# Patient Record
Sex: FEMALE | Race: WHITE | NOT HISPANIC OR LATINO | Employment: OTHER | URBAN - NONMETROPOLITAN AREA
[De-identification: names, ages, dates, MRNs, and addresses within clinical notes are randomized per-mention and may not be internally consistent; named-entity substitution may affect disease eponyms.]

---

## 2022-03-01 ENCOUNTER — HISTORICAL (OUTPATIENT)
Dept: ADMINISTRATIVE | Facility: HOSPITAL | Age: 37
End: 2022-03-01

## 2022-04-11 ENCOUNTER — HISTORICAL (OUTPATIENT)
Dept: ADMINISTRATIVE | Facility: HOSPITAL | Age: 37
End: 2022-04-11

## 2022-04-25 VITALS
WEIGHT: 293 LBS | DIASTOLIC BLOOD PRESSURE: 80 MMHG | HEIGHT: 64 IN | BODY MASS INDEX: 50.02 KG/M2 | SYSTOLIC BLOOD PRESSURE: 138 MMHG | OXYGEN SATURATION: 98 %

## 2022-10-05 ENCOUNTER — OFFICE VISIT (OUTPATIENT)
Dept: RHEUMATOLOGY | Facility: CLINIC | Age: 37
End: 2022-10-05
Payer: MEDICAID

## 2022-10-05 VITALS
DIASTOLIC BLOOD PRESSURE: 68 MMHG | WEIGHT: 293 LBS | BODY MASS INDEX: 50.02 KG/M2 | HEART RATE: 92 BPM | SYSTOLIC BLOOD PRESSURE: 132 MMHG | HEIGHT: 64 IN | RESPIRATION RATE: 18 BRPM | OXYGEN SATURATION: 97 %

## 2022-10-05 DIAGNOSIS — Z86.39 HISTORY OF VITAMIN D DEFICIENCY: ICD-10-CM

## 2022-10-05 DIAGNOSIS — Z11.59 ENCOUNTER FOR HEPATITIS C SCREENING TEST FOR LOW RISK PATIENT: ICD-10-CM

## 2022-10-05 DIAGNOSIS — R68.89 NONSPECIFIC ABNORMAL FINDING: ICD-10-CM

## 2022-10-05 DIAGNOSIS — M79.10 MYALGIA: ICD-10-CM

## 2022-10-05 DIAGNOSIS — Z11.1 SCREENING-PULMONARY TB: ICD-10-CM

## 2022-10-05 DIAGNOSIS — M25.59 PAIN IN OTHER JOINT: Primary | ICD-10-CM

## 2022-10-05 DIAGNOSIS — M51.36 LUMBAR DEGENERATIVE DISC DISEASE: ICD-10-CM

## 2022-10-05 DIAGNOSIS — M25.50 POLYARTHRALGIA: ICD-10-CM

## 2022-10-05 DIAGNOSIS — Z13.89 SCREENING FOR RHEUMATIC DISORDER: ICD-10-CM

## 2022-10-05 PROCEDURE — 3078F DIAST BP <80 MM HG: CPT | Mod: CPTII,S$GLB,, | Performed by: INTERNAL MEDICINE

## 2022-10-05 PROCEDURE — 3075F SYST BP GE 130 - 139MM HG: CPT | Mod: CPTII,S$GLB,, | Performed by: INTERNAL MEDICINE

## 2022-10-05 PROCEDURE — 99204 OFFICE O/P NEW MOD 45 MIN: CPT | Mod: S$GLB,,, | Performed by: INTERNAL MEDICINE

## 2022-10-05 PROCEDURE — 1160F PR REVIEW ALL MEDS BY PRESCRIBER/CLIN PHARMACIST DOCUMENTED: ICD-10-PCS | Mod: CPTII,S$GLB,, | Performed by: INTERNAL MEDICINE

## 2022-10-05 PROCEDURE — 1160F RVW MEDS BY RX/DR IN RCRD: CPT | Mod: CPTII,S$GLB,, | Performed by: INTERNAL MEDICINE

## 2022-10-05 PROCEDURE — 3078F PR MOST RECENT DIASTOLIC BLOOD PRESSURE < 80 MM HG: ICD-10-PCS | Mod: CPTII,S$GLB,, | Performed by: INTERNAL MEDICINE

## 2022-10-05 PROCEDURE — 3008F PR BODY MASS INDEX (BMI) DOCUMENTED: ICD-10-PCS | Mod: CPTII,S$GLB,, | Performed by: INTERNAL MEDICINE

## 2022-10-05 PROCEDURE — 1159F PR MEDICATION LIST DOCUMENTED IN MEDICAL RECORD: ICD-10-PCS | Mod: CPTII,S$GLB,, | Performed by: INTERNAL MEDICINE

## 2022-10-05 PROCEDURE — 99204 PR OFFICE/OUTPT VISIT, NEW, LEVL IV, 45-59 MIN: ICD-10-PCS | Mod: S$GLB,,, | Performed by: INTERNAL MEDICINE

## 2022-10-05 PROCEDURE — 3075F PR MOST RECENT SYSTOLIC BLOOD PRESS GE 130-139MM HG: ICD-10-PCS | Mod: CPTII,S$GLB,, | Performed by: INTERNAL MEDICINE

## 2022-10-05 PROCEDURE — 1159F MED LIST DOCD IN RCRD: CPT | Mod: CPTII,S$GLB,, | Performed by: INTERNAL MEDICINE

## 2022-10-05 PROCEDURE — 3008F BODY MASS INDEX DOCD: CPT | Mod: CPTII,S$GLB,, | Performed by: INTERNAL MEDICINE

## 2022-10-05 RX ORDER — ATENOLOL 50 MG/1
50 TABLET ORAL DAILY
COMMUNITY
Start: 2022-09-20 | End: 2023-02-03

## 2022-10-05 RX ORDER — BUPROPION HYDROCHLORIDE 150 MG/1
450 TABLET ORAL
COMMUNITY
Start: 2022-09-07

## 2022-10-05 RX ORDER — PANTOPRAZOLE SODIUM 40 MG/1
40 TABLET, DELAYED RELEASE ORAL DAILY
COMMUNITY
Start: 2022-09-28 | End: 2023-08-10

## 2022-10-05 RX ORDER — DICLOFENAC SODIUM 75 MG/1
TABLET, DELAYED RELEASE ORAL
COMMUNITY
Start: 2022-09-13 | End: 2023-08-10

## 2022-10-05 RX ORDER — ERGOCALCIFEROL 1.25 MG/1
50000 CAPSULE ORAL
COMMUNITY
Start: 2022-08-16 | End: 2023-08-10

## 2022-10-05 RX ORDER — DARUNAVIR, COBICISTAT, EMTRICITABINE, AND TENOFOVIR ALAFENAMIDE 800; 150; 200; 10 MG/1; MG/1; MG/1; MG/1
1 TABLET, FILM COATED ORAL DAILY
COMMUNITY
Start: 2022-09-20 | End: 2023-08-10

## 2022-10-05 RX ORDER — HYDROCHLOROTHIAZIDE 25 MG/1
25 TABLET ORAL EVERY MORNING
COMMUNITY
Start: 2022-09-20 | End: 2023-08-10

## 2022-10-05 RX ORDER — GLIMEPIRIDE 1 MG/1
TABLET ORAL
COMMUNITY
Start: 2022-09-20 | End: 2023-08-10

## 2022-10-05 RX ORDER — CLONAZEPAM 0.5 MG/1
TABLET ORAL
COMMUNITY
Start: 2022-09-22 | End: 2023-08-10

## 2022-10-21 NOTE — PROGRESS NOTES
Subjective:      Patient ID: Windy Ponce is a 37 y.o. female.    Chief Complaint: Disease Management    HPI:   Includes joint pain with subjective swelling.   Antecedent event includes: None;  Pain location includes: right elbow, hands, knees, right ankle, and feet;  Gradual onset; beginning >years ago;  Constant ache, Moderate in severity,   Lasting >hours, Worse daytime;   Improved with rest, medication, change in position, and none;   Worsened with activity and overuse;         Review of Systems   Constitutional:  Positive for fatigue. Negative for chills and fever.   HENT:  Negative for nasal congestion, ear pain, hearing loss, mouth dryness, mouth sores, nosebleeds and trouble swallowing.    Eyes:  Negative for photophobia, pain, redness, visual disturbance and eye dryness.   Respiratory:  Positive for shortness of breath. Negative for cough.    Cardiovascular:  Positive for leg swelling. Negative for chest pain and palpitations.   Gastrointestinal:  Negative for abdominal distention, abdominal pain, blood in stool, constipation, diarrhea, rectal pain, vomiting and fecal incontinence.   Endocrine: Negative for polydipsia and polyuria.   Genitourinary:  Negative for bladder incontinence, dysuria, enuresis, genital sores and hematuria.   Musculoskeletal:  Positive for arthralgias, joint swelling and myalgias.   Integumentary:  Negative for rash, wound and mole/lesion.   Neurological:  Negative for weakness and headaches.   Hematological:  Negative for adenopathy. Bruises/bleeds easily.   Psychiatric/Behavioral:  Positive for dysphoric mood and sleep disturbance. The patient is nervous/anxious.     Past Medical History:   Diagnosis Date    Acid reflux     Anxiety     Arthritis     Diabetes mellitus     Dry mouth     HIV infection     Hypertension      History reviewed. No pertinent surgical history.   See the Assessment/Plan for further characterization of the HPI, ROS, Medical, Surgical, Family, and Social  "Histories including Tobacco use, Meds; all of which has been reviewed in Epic.    Medication List with Changes/Refills   New Medications    HYDROXYCHLOROQUINE (PLAQUENIL) 200 MG TABLET    Take 1.5 tablets (300 mg total) by mouth once daily.   Current Medications    ATENOLOL (TENORMIN) 50 MG TABLET    Take 50 mg by mouth once daily.    BUPROPION (WELLBUTRIN XL) 150 MG TB24 TABLET    Take 450 mg by mouth.    CLONAZEPAM (KLONOPIN) 0.5 MG TABLET    TAKE ONE TABLET BY MOUTH TWICE DAILY AS NEEDED FOR SEVERE anxiety    DICLOFENAC (VOLTAREN) 75 MG EC TABLET    TAKE 1 TABLET BY MOUTH TWO TIMES A DAY WITH MEALS MAY TAKE WITH TYLENOL AVOID OTHER NSAIDS    ERGOCALCIFEROL (ERGOCALCIFEROL) 50,000 UNIT CAP    Take 50,000 Units by mouth every 7 days.    GLIMEPIRIDE (AMARYL) 1 MG TABLET    TAKE ONE TABLET BY MOUTH WITH BREAKFAST OR THE FIRST meal of THE DAY EVERY DAY    HYDROCHLOROTHIAZIDE (HYDRODIURIL) 25 MG TABLET    Take 25 mg by mouth every morning.    PANTOPRAZOLE (PROTONIX) 40 MG TABLET    Take 40 mg by mouth once daily.    PREGABALIN (LYRICA) 75 MG CAPSULE    Take 75 mg by mouth 2 (two) times daily.    SYMTUZA [id number] MG TAB    Take 1 tablet by mouth once daily.         Objective:   BP (!) 142/77   Pulse 109   Resp 18   Ht 5' 4" (1.626 m)   Wt (!) 230.8 kg (508 lb 14.4 oz)   SpO2 95%   BMI 87.35 kg/m²   Physical Exam  Non-toxic appearance. No distress.   Normocephalic and atraumatic.   External ears normal.    Conjunctivae and EOM are normal. No scleral icterus.   RRR, No friction rub; palpable distal pulses.    No tachypnea or signs of respiratory distress.   Abdominal: No guarding or rebound tenderness.   Neurological: Oriented. Normal thought content. Strength is grossly normal without focal deficit.  Skin is warm. No pallor.   Musculoskeletal: see below for further input.     X-Ray Chest PA Lateral With Lordotic Vie  CHEST 2 VIEWS :     HISTORY: Cough          COMPARISON: None available     FINDINGS: PA/AP " and lateral views reveal the heart to be normal in  size. The trachea is midline. No definite infiltrate or effusion is  seen. There is mild prominence of the parahilar regions. Bony  structures are grossly intact.     IMPRESSION:  1.Mildly prominent parahilar regions bilaterally compatible with a  mild bronchitis. Clinical correlation is indicated.        Electronically Signed By: Dax Barreto MD  Date/Time Signed: 03/07/2017 08:46    No visits with results within 1 Year(s) from this visit.   Latest known visit with results is:   No results found for any previous visit.        All of the data above and below has been reviewed by myself and any further interpretations will be reflected in the assessment and plan.   The data includes review of external notes, and independent interpretation of lab results, x-rays, and imaging reports.  Active inflammatory arthritis is an illness that poses a threat to bodily function and even a threat to life in some cases.  Drug therapy to treat inflammatory arthritis usually requires intensive monitoring for toxicity.    Review of patient's allergies indicates:   Allergen Reactions    Penicillin Rash     Assessment/Plan:         Prev noted/prior plan:  (She tells me she went to get MRI knee but too large for the machine and to have weight loss surgery in the near future     She tells me she also has chronic HIV noted     No overt synovitis  Mouth mucosa moist; she tells me she has chronic intermittent dry mouth noted  Obese  DJD     Verbal education     Blood work to further evaluate     We will seek records re HIV most recent note     Cont current for now; sounds like mostly comfortable at rest     Revisit weeks     Contact us prn)    This visit:    Interim lab 10/28/22:    SPEP normal    IgG 1745 little high  IgA 337  IgM 252 little high    Creat 0.89; alb 3.6      ALT 41  AST 42    CPK 93    WBC 9.5; Hgb 12.7; plt 343    JUNAID neg RF 58+ CCP neg HepBsAgnegsAb+coreAbneg HCV  "neg Uric acid 6.2    C3 223  C4 40      ESR not done CRP 5.3      Interim records re HIV most recent note:      Prev noted:  (She tells me she went to get MRI knee but too large for the machine and to have weight loss surgery in the near future   She tells me she also has chronic HIV noted   No overt synovitis  Mouth mucosa moist; she tells me she has chronic intermittent dry mouth noted  Obese  DJD   Blood work to further evaluate   We will seek records re HIV most recent note   Voltaren 75mg bid prn   DM amaryl noted   Clonazepam noted   Wellbutrin   Prior cymbalta noted   Had MRSA abdominal skin abscess in early 2022 per very limited records   Cont current for now; sounds like mostly comfortable at rest)      She sounds like she has been doing worse and reports some interim joint swelling noted    Facial rash she tells me is rosacea but may also have component of seb derm; She tells me she has had the rash her whole life; She has never seen a Dermatologist but tells me she recently saw an Orthopedics doctor who told her is was likely a "rheumatoid rash" noted      No overt synovitis but some difficult with body habitus noted  See above    Verbal education and some written    Went over lab     I am okay with     adding plaquenil 300mg daily for now; she can call if joint swelling recurs    Refer to Ophthalmology for plaquenil exam    We will refer to Dermatology for the rash and skin exam    We will reseek records re HIV most recent note    We will also seek records from PCP most recent note    Cont Voltaren 75mg bid prn     Lyrica 75mg bid noted: she initially was not sure if using but I investigated and she got from [facility] [place] [facility] and she recalls getting the voltaren and a bit back and forth about the lyrica and not sure what was said or plan ( told me "nothing" until I brought up the new meds noted) but to also revisit in month and they can have the note sent to me and I can go over it with " her    Revisit lab 2 weeks prior    Contact us prn      JUNAID neg RF93.7+ repeat RF 58+ CCP neg HepBsAgnegsAb+coreAbneg HCV neg Uric acid 6.2  There is some history to suggest inflammatory arthritis.     There is also some mechanical and neurogenic pain.     Lumbar DJD DDD per records     She should consider also working with Physical Medicine and Rehab MD if needed     Prev noted/prior plan:  (She tells me she went to get MRI knee but too large for the machine and to have weight loss surgery in the near future     She tells me she also has chronic HIV noted     No overt synovitis  Mouth mucosa moist; she tells me she has chronic intermittent dry mouth noted  Obese  DJD     Verbal education   Blood work to further evaluate   We will seek records re HIV most recent note   Cont current for now; sounds like mostly comfortable at rest   Revisit weeks   Contact us prn)     Has been managing with:     Voltaren 75mg bid prn     Has been working with:     DM amaryl noted     Clonazepam noted     Wellbutrin     Prior cymbalta noted     Had MRSA abdominal skin abscess in early 2022 per very limited records     HIV also mentioned on Medical history in Epic     We will seek records regarding the HIV infection     Records [facility] PCP some of note 8/16/22: f/u herniated disc lumbar; A/P: Labs ESR 76; RF 93.7; vitD 21.6; CRP 4.6; will place on home therapy for back and knees; refer to rheumatology; morbid obese weighs over 500lbs;  bariatric surgery discussed; add vitD 50,000 units weekly; consider MRI lumbar; revisit 4 weeks; diclofenac 75mg ibuprofen 800mg cymbalta 60mg metformin other.     Review of medical records as stated above.  Lab +/- imaging and other ordered diagnostic studies to further evaluate.  See the orders associated with this note visit.  Medications as prescribed as tolerated.    Education including verbal and those noted in the patient instructions.  Revisit as scheduled and call prn.    The following  diagnoses influence medical decision making and/or need further workup including the orders listed below:    Pain in other joint  -     CBC Auto Differential; Future; Expected date: 01/20/2023  -     CK; Future; Expected date: 01/20/2023  -     Comprehensive Metabolic Panel; Future; Expected date: 01/20/2023  -     C-Reactive Protein; Future; Expected date: 01/20/2023  -     Sedimentation rate; Future; Expected date: 01/20/2023  -     Urinalysis; Future; Expected date: 01/20/2023    Other osteoarthritis involving multiple joints  -     CBC Auto Differential; Future; Expected date: 01/20/2023  -     CK; Future; Expected date: 01/20/2023  -     Comprehensive Metabolic Panel; Future; Expected date: 01/20/2023  -     C-Reactive Protein; Future; Expected date: 01/20/2023  -     Sedimentation rate; Future; Expected date: 01/20/2023  -     Urinalysis; Future; Expected date: 01/20/2023  -     hydrOXYchloroQUINE (PLAQUENIL) 200 mg tablet; Take 1.5 tablets (300 mg total) by mouth once daily.  Dispense: 45 tablet; Refill: 4    Lumbar degenerative disc disease  -     CBC Auto Differential; Future; Expected date: 01/20/2023  -     CK; Future; Expected date: 01/20/2023  -     Comprehensive Metabolic Panel; Future; Expected date: 01/20/2023  -     C-Reactive Protein; Future; Expected date: 01/20/2023  -     Sedimentation rate; Future; Expected date: 01/20/2023  -     Urinalysis; Future; Expected date: 01/20/2023    Polyarthralgia  -     CBC Auto Differential; Future; Expected date: 01/20/2023  -     CK; Future; Expected date: 01/20/2023  -     Comprehensive Metabolic Panel; Future; Expected date: 01/20/2023  -     C-Reactive Protein; Future; Expected date: 01/20/2023  -     Sedimentation rate; Future; Expected date: 01/20/2023  -     Urinalysis; Future; Expected date: 01/20/2023  -     Ambulatory referral/consult to Ophthalmology; Future; Expected date: 11/11/2022  -     hydrOXYchloroQUINE (PLAQUENIL) 200 mg tablet; Take 1.5  tablets (300 mg total) by mouth once daily.  Dispense: 45 tablet; Refill: 4    Myalgia  -     CBC Auto Differential; Future; Expected date: 01/20/2023  -     CK; Future; Expected date: 01/20/2023  -     Comprehensive Metabolic Panel; Future; Expected date: 01/20/2023  -     C-Reactive Protein; Future; Expected date: 01/20/2023  -     Sedimentation rate; Future; Expected date: 01/20/2023  -     Urinalysis; Future; Expected date: 01/20/2023    Nonspecific abnormal finding  -     CBC Auto Differential; Future; Expected date: 01/20/2023  -     CK; Future; Expected date: 01/20/2023  -     Comprehensive Metabolic Panel; Future; Expected date: 01/20/2023  -     C-Reactive Protein; Future; Expected date: 01/20/2023  -     Sedimentation rate; Future; Expected date: 01/20/2023  -     Urinalysis; Future; Expected date: 01/20/2023  -     Ambulatory referral/consult to Ophthalmology; Future; Expected date: 11/11/2022  -     hydrOXYchloroQUINE (PLAQUENIL) 200 mg tablet; Take 1.5 tablets (300 mg total) by mouth once daily.  Dispense: 45 tablet; Refill: 4    Screening for rheumatic disorder  -     CBC Auto Differential; Future; Expected date: 01/20/2023  -     CK; Future; Expected date: 01/20/2023  -     Comprehensive Metabolic Panel; Future; Expected date: 01/20/2023  -     C-Reactive Protein; Future; Expected date: 01/20/2023  -     Sedimentation rate; Future; Expected date: 01/20/2023  -     Urinalysis; Future; Expected date: 01/20/2023    Rash  -     CBC Auto Differential; Future; Expected date: 01/20/2023  -     CK; Future; Expected date: 01/20/2023  -     Comprehensive Metabolic Panel; Future; Expected date: 01/20/2023  -     C-Reactive Protein; Future; Expected date: 01/20/2023  -     Sedimentation rate; Future; Expected date: 01/20/2023  -     Urinalysis; Future; Expected date: 01/20/2023  -     Ambulatory referral/consult to Dermatology; Future; Expected date: 11/11/2022    Medication monitoring encounter  -     CBC Auto  Differential; Future; Expected date: 01/20/2023  -     CK; Future; Expected date: 01/20/2023  -     Comprehensive Metabolic Panel; Future; Expected date: 01/20/2023  -     C-Reactive Protein; Future; Expected date: 01/20/2023  -     Sedimentation rate; Future; Expected date: 01/20/2023  -     Urinalysis; Future; Expected date: 01/20/2023  -     Ambulatory referral/consult to Ophthalmology; Future; Expected date: 11/11/2022    Follow up in about 3 months (around 2/4/2023).     Richard Peoples MD

## 2022-11-04 ENCOUNTER — OFFICE VISIT (OUTPATIENT)
Dept: RHEUMATOLOGY | Facility: CLINIC | Age: 37
End: 2022-11-04
Payer: MEDICAID

## 2022-11-04 VITALS
BODY MASS INDEX: 50.02 KG/M2 | SYSTOLIC BLOOD PRESSURE: 142 MMHG | HEART RATE: 109 BPM | RESPIRATION RATE: 18 BRPM | WEIGHT: 293 LBS | HEIGHT: 64 IN | OXYGEN SATURATION: 95 % | DIASTOLIC BLOOD PRESSURE: 77 MMHG

## 2022-11-04 DIAGNOSIS — Z13.89 SCREENING FOR RHEUMATIC DISORDER: ICD-10-CM

## 2022-11-04 DIAGNOSIS — M79.10 MYALGIA: ICD-10-CM

## 2022-11-04 DIAGNOSIS — R68.89 NONSPECIFIC ABNORMAL FINDING: ICD-10-CM

## 2022-11-04 DIAGNOSIS — M25.50 POLYARTHRALGIA: ICD-10-CM

## 2022-11-04 DIAGNOSIS — M15.8 OTHER OSTEOARTHRITIS INVOLVING MULTIPLE JOINTS: ICD-10-CM

## 2022-11-04 DIAGNOSIS — M51.36 LUMBAR DEGENERATIVE DISC DISEASE: ICD-10-CM

## 2022-11-04 DIAGNOSIS — Z51.81 MEDICATION MONITORING ENCOUNTER: ICD-10-CM

## 2022-11-04 DIAGNOSIS — M25.59 PAIN IN OTHER JOINT: Primary | ICD-10-CM

## 2022-11-04 DIAGNOSIS — R21 RASH: ICD-10-CM

## 2022-11-04 PROCEDURE — 1159F PR MEDICATION LIST DOCUMENTED IN MEDICAL RECORD: ICD-10-PCS | Mod: CPTII,S$GLB,, | Performed by: INTERNAL MEDICINE

## 2022-11-04 PROCEDURE — 3008F PR BODY MASS INDEX (BMI) DOCUMENTED: ICD-10-PCS | Mod: CPTII,S$GLB,, | Performed by: INTERNAL MEDICINE

## 2022-11-04 PROCEDURE — 1160F RVW MEDS BY RX/DR IN RCRD: CPT | Mod: CPTII,S$GLB,, | Performed by: INTERNAL MEDICINE

## 2022-11-04 PROCEDURE — 1159F MED LIST DOCD IN RCRD: CPT | Mod: CPTII,S$GLB,, | Performed by: INTERNAL MEDICINE

## 2022-11-04 PROCEDURE — 99214 PR OFFICE/OUTPT VISIT, EST, LEVL IV, 30-39 MIN: ICD-10-PCS | Mod: S$GLB,,, | Performed by: INTERNAL MEDICINE

## 2022-11-04 PROCEDURE — 3008F BODY MASS INDEX DOCD: CPT | Mod: CPTII,S$GLB,, | Performed by: INTERNAL MEDICINE

## 2022-11-04 PROCEDURE — 3077F PR MOST RECENT SYSTOLIC BLOOD PRESSURE >= 140 MM HG: ICD-10-PCS | Mod: CPTII,S$GLB,, | Performed by: INTERNAL MEDICINE

## 2022-11-04 PROCEDURE — 1160F PR REVIEW ALL MEDS BY PRESCRIBER/CLIN PHARMACIST DOCUMENTED: ICD-10-PCS | Mod: CPTII,S$GLB,, | Performed by: INTERNAL MEDICINE

## 2022-11-04 PROCEDURE — 3078F PR MOST RECENT DIASTOLIC BLOOD PRESSURE < 80 MM HG: ICD-10-PCS | Mod: CPTII,S$GLB,, | Performed by: INTERNAL MEDICINE

## 2022-11-04 PROCEDURE — 3078F DIAST BP <80 MM HG: CPT | Mod: CPTII,S$GLB,, | Performed by: INTERNAL MEDICINE

## 2022-11-04 PROCEDURE — 3077F SYST BP >= 140 MM HG: CPT | Mod: CPTII,S$GLB,, | Performed by: INTERNAL MEDICINE

## 2022-11-04 PROCEDURE — 99214 OFFICE O/P EST MOD 30 MIN: CPT | Mod: S$GLB,,, | Performed by: INTERNAL MEDICINE

## 2022-11-04 RX ORDER — HYDROXYCHLOROQUINE SULFATE 200 MG/1
300 TABLET, FILM COATED ORAL DAILY
Qty: 45 TABLET | Refills: 4 | Status: SHIPPED | OUTPATIENT
Start: 2022-11-04 | End: 2023-02-03 | Stop reason: SDUPTHER

## 2022-11-04 RX ORDER — PREGABALIN 75 MG/1
75 CAPSULE ORAL 2 TIMES DAILY
COMMUNITY
Start: 2022-10-13 | End: 2023-08-10

## 2022-11-04 NOTE — PATIENT INSTRUCTIONS
Hydroxychloroquine (Plaquenil) is considered a disease-modifying anti-rheumatic drug (DMARD). It can decrease the pain and swelling of arthritis. It may prevent joint damage and reduce the risk of long-term disability. Plaquenil is used to treat rheumatoid arthritis, some symptoms of lupus, childhood arthritis (or juvenile idiopathic arthritis) and other autoimmune diseases.     How to Take It  Hydroxychloroquine comes in an oral tablet. Adult dosing ranges from 200 mg or 400 mg per day (6.5mg/kg). In some cases, higher doses can be used. It is recommended one tablet twice daily if taking more than one tablet. It is recommended to be taken with food. Symptoms can start to improve in one to two months, but it may take up to six months before the full benefits of this medication are experienced.    Hydroxychloroquine typically is very well tolerated. Serious side effects are rare. The most common side effects are nausea and diarrhea, which often improve with time. Less common side effects include rash, changes in skin pigment (such as darkening or dark spots).    In rare cases, hydroxychloroquine can cause visual changes or loss of vision. Such vision problems are more likely to occur in individuals taking high doses for many years, individuals 60 years or older, or those with significant kidney disease. At the recommended dose, development of visual problems due to the medication is rare. It is recommended that you have an eye exam within the first year of use, then repeat every one to five years based on current guidelines.

## 2023-01-20 NOTE — PROGRESS NOTES
Subjective:      Patient ID: Windy Ponce is a 38 y.o. female.    Chief Complaint: Disease Management    HPI:   Includes joint pain without subjective swelling.   Antecedent event includes: None;  Pain location includes: hands, knees, ankles, feet, and back;  Gradual onset; beginning >years ago;  Constant ache, Moderate in severity,   Lasting >hours, Worse at all times;   Improved with rest, medication, change in position, and none;   Worsened with activity, overuse, stress, and rest;         Review of Systems   Constitutional:  Positive for unexpected weight change. Negative for fever.   HENT:  Negative for mouth sores and trouble swallowing.    Eyes:  Negative for redness.   Respiratory:  Negative for cough and shortness of breath.    Cardiovascular:  Negative for chest pain.   Gastrointestinal:  Positive for reflux. Negative for constipation and diarrhea.   Genitourinary:  Negative for dysuria and genital sores.   Musculoskeletal:  Positive for arthralgias and myalgias. Negative for joint swelling.   Integumentary:  Negative for rash.   Neurological:  Positive for weakness. Negative for headaches.   Hematological:  Bruises/bleeds easily.   Psychiatric/Behavioral:  Positive for dysphoric mood and sleep disturbance. The patient is nervous/anxious.     Past Medical History:   Diagnosis Date    Acid reflux     Anxiety     Arthritis     Diabetes mellitus     Dry mouth     HIV infection     Hypertension      History reviewed. No pertinent surgical history.   See the Assessment/Plan for further characterization of the HPI, ROS, Medical, Surgical, Family, and Social Histories including Tobacco use, Meds; all of which has been reviewed in Epic.    Medication List with Changes/Refills   Current Medications    BUPROPION (WELLBUTRIN XL) 150 MG TB24 TABLET    Take 450 mg by mouth.    CLONAZEPAM (KLONOPIN) 0.5 MG TABLET    TAKE ONE TABLET BY MOUTH TWICE DAILY AS NEEDED FOR SEVERE anxiety    DICLOFENAC (VOLTAREN) 75 MG EC  "TABLET    TAKE 1 TABLET BY MOUTH TWO TIMES A DAY WITH MEALS MAY TAKE WITH TYLENOL AVOID OTHER NSAIDS    ERGOCALCIFEROL (ERGOCALCIFEROL) 50,000 UNIT CAP    Take 50,000 Units by mouth every 7 days.    GLIMEPIRIDE (AMARYL) 1 MG TABLET    TAKE ONE TABLET BY MOUTH WITH BREAKFAST OR THE FIRST meal of THE DAY EVERY DAY    HYDROCHLOROTHIAZIDE (HYDRODIURIL) 25 MG TABLET    Take 25 mg by mouth every morning.    PANTOPRAZOLE (PROTONIX) 40 MG TABLET    Take 40 mg by mouth once daily.    PREGABALIN (LYRICA) 75 MG CAPSULE    Take 75 mg by mouth 2 (two) times daily.    SERTRALINE (ZOLOFT) 50 MG TABLET    Take 50 mg by mouth.    SYMTUZA [id number] MG TAB    Take 1 tablet by mouth once daily.    TRAZODONE (DESYREL) 50 MG TABLET    Take 50 mg by mouth every evening.   Changed and/or Refilled Medications    Modified Medication Previous Medication    HYDROXYCHLOROQUINE (PLAQUENIL) 200 MG TABLET hydrOXYchloroQUINE (PLAQUENIL) 200 mg tablet       Take 1.5 tablets (300 mg total) by mouth once daily.    Take 1.5 tablets (300 mg total) by mouth once daily.   Discontinued Medications    ATENOLOL (TENORMIN) 50 MG TABLET    Take 50 mg by mouth once daily.         Objective:   /77   Pulse 80   Resp 18   Ht 5' 4" (1.626 m)   Wt (!) 210 kg (462 lb 14.4 oz)   SpO2 99%   BMI 79.46 kg/m²   Physical Exam  Non-toxic appearance. No distress.   Normocephalic and atraumatic.   External ears normal.    Conjunctivae and EOM are normal. No scleral icterus.   RRR, No friction rub; palpable distal pulses.    No tachypnea or signs of respiratory distress.   Abdominal: No guarding or rebound tenderness.   Neurological: Oriented. Normal thought content.   Skin is warm. No pallor.   Musculoskeletal: see below for further input.     X-Ray Knee AP Standing Bilateral  STUDY:  Xray both knees ((AP weightbearing views of both knees) views)         [id number] 2:25 PM 3/1/2022:    CURRENT CLINICAL HISTORY: OP    X MONTHS    -BILAT KNEE PAIN, LIMITED ROM    " -PT SIGNED UPT WAIVER        PAST MEDICAL HISTORY: LARGE BODY HABITUS        TECHNIQUE: 2 VIEWS OF KNEES STANDING        TECHNOLOGIST: RAVI                COMPARISON: Nonweightbearing views of both knees obtained earlier the same day            FINDINGS:          Moderate degenerative changes are noted involving the medial compartments of both knees including at least moderate joint space narrowing (which is more pronounced than noted on the nonweightbearing views obtained on the same day) and mild joint space   narrowing within lateral compartments with mild subchondral sclerosis and hypertrophic spur formation noted within both the medial and lateral compartments.  I see no definite fractures or dislocations.            IMPRESSION:        1.   At least moderate joint space narrowing is noted within the medial compartments of both knees and is more pronounced than on the ninth views obtained earlier the same day.  Additional degenerative changes are present as described above.          X-Ray Knee 3 View Left  STUDY:  Xray left knee (3  views)        [id number] 2:25 PM 3/1/2022:    CURRENT CLINICAL HISTORY: OP    X MONTHS    -BILAT KNEE PAIN, LIMITED ROM    -PT SIGNED UPT WAIVER        PAST MEDICAL HISTORY: LARGE BODY HABITUS        TECHNIQUE: 3 VIEWS OF LEFT KNEE        TECHNOLOGIST: RAVI                 COMPARISON: none            FINDINGS:        Mild to moderate degenerative changes noted involving the medial compartment of the left knee (including mild to moderate joint space narrowing with mild subchondral sclerosis and hypertrophic spur formation) and to a lesser extent the lateral and   patellofemoral compartments. I see no definite displaced fractures, dislocations, or other significant abnormalities.            IMPRESSION:        1. Chronic degenerative changes are present as described above. See above comments.          X-Ray Knee 3 View Right  STUDY:  Xray right knee (3  views)          [id number] 2:25  PM 3/1/2022:    CURRENT CLINICAL HISTORY: OP    X MONTHS    -BILAT KNEE PAIN, LIMITED ROM    -PT SIGNED UPT WAIVER        PAST MEDICAL HISTORY: LARGE BODY HABITUS        TECHNIQUE: 3 VIEWS OF RIGHT KNEE        TECHNOLOGIST: RAVI               COMPARISON: none            FINDINGS:        Mild to moderate degenerative changes noted involving the medial compartment of the right knee (including mild to moderate joint space narrowing with mild subchondral sclerosis and hypertrophic spur formation) and to a lesser extent the lateral and   patellofemoral compartments.  I see no definite displaced fractures, dislocations, or other significant abnormalities.            IMPRESSION:        1.   Chronic degenerative changes are present as described above.  See above comments.          X-Ray Lumbar Spine 5 View  STUDY: Xray lumbar  spine (4-5  views)         [id number] 2:24 PM 3/1/2022:    CURRENT CLINICAL HISTORY: OP    X YEARS    -LOWER BACK PAIN RADIATING DOWN BILAT LEGS    -DENIES TRAUMA    -PT SIGNED UPT WAIVER        PAST MEDICAL HISTORY: LARGE BODY HABITUS        TECHNIQUE: 4-5 VIEWS OF LUMBAR SPINE        TECHNOLOGIST: RAVI                 COMPARISON: none            FINDINGS:           MISCELLANEOUS: Mild vertebral body and facet joint spurring are noted throughout the lumbar spine.           FRACTURES:   No definite acute fractures noted.           ALIGNMENT: No significant spondylolisthesis noted.           SOFT TISSUE:  There is no focal soft tissue swelling or paravertebral hematomas noted.            IMPRESSION:        1.  Chronic changes are present as described above in miscellaneous.  See above comments.        2.  No acute fractures or spondylolisthesis  are noted.    Lab Requisition on 01/24/2023   Component Date Value Ref Range Status    Urine Culture 01/24/2023 Multiple organisms present indicate probable contamination. Recommend recollection.   Final    Urine Culture 01/24/2023 10,000 - 25,000  colonies/ml GAMMA STREPTOCOCCUS (A)   Final    Urine Culture 01/24/2023 Less than 10,000 colonies/ml Streptococcus agalactiae (Group B) (A)   Final    Urine Culture 01/24/2023 Less than 10,000 colonies/ml Gram-positive Cocci (A)   Final    Clyde counts <10,000/ml are of questionable significance and may or may not indicate contamination.  Therefore organisms are identified only.  If further workup is desired please notify Microbiology    Office Visit on 11/04/2022   Component Date Value Ref Range Status    Creatine Kinase 01/24/2023 162 (H)  30 - 135 U/L Final    Sodium Level 01/24/2023 139  135 - 145 mmol/L Final    Potassium Level 01/24/2023 4.3  3.5 - 5.1 mmol/L Final    Chloride 01/24/2023 99  98 - 110 mmol/L Final    Carbon Dioxide 01/24/2023 28  21 - 32 mmol/L Final    Glucose Level 01/24/2023 120 (H)  70 - 115 mg/dL Final    Blood Urea Nitrogen 01/24/2023 12.0  7.0 - 20.0 mg/dL Final    Creatinine 01/24/2023 1.24  0.66 - 1.25 mg/dL Final    Calcium Level Total 01/24/2023 9.9  8.4 - 10.2 mg/dL Final    Protein Total 01/24/2023 8.3 (H)  6.3 - 8.2 gm/dL Final    Albumin Level 01/24/2023 4.2  3.4 - 5.0 g/dL Final    Globulin 01/24/2023 4.1 (H)  2.0 - 3.9 gm/dL Final    Albumin/Globulin Ratio 01/24/2023 1.0  ratio Final    Bilirubin Total 01/24/2023 0.8  0.0 - 1.0 mg/dL Final    Alkaline Phosphatase 01/24/2023 101  50 - 144 unit/L Final    Alanine Aminotransferase 01/24/2023 40  1 - 45 unit/L Final    Aspartate Aminotransferase 01/24/2023 40 (H)  14 - 36 unit/L Final    eGFR 01/24/2023 57  mls/min/1.73/m2 Final                         EGFR INTERPRETATION    Beginning 8/15/22 we are reporting the eGFRcr calculation as recommended by the [employer]. The eGFRcr equation has similar overall performance characteristics to the older equation, but the values may differ by more than 10% particularly at higher values of eGFRcr and younger adult ages.    NKF stages of chronic kidney disease (CKD)  Stage 1:  Kidney damage with normal or increased eGFR (>90 mL/min/1.73 m^2)  Stage 2: Mild reduction in GFR (60-89 mL/min/1.73 m^2)  Stage 3a: Moderate reduction in GFR (45-59 mL/min/1.73 m^2)  Stage 3b: Moderate reduction in GFR (30-44 mL/min/1.73 m^2)  Stage 4: Severe reduction in GFR (15-29 mL/min/1.73 m^2)  Stage 5: Kidney failure (GFR <15 mL/min/1.73 m^2)        CRP 01/24/2023 6.5 (H)  0 - 0.9 mg/dL Final    Sed Rate 01/24/2023 93 (H)  0 - 20 mm/hr Final    Color, UA 01/24/2023 Yellow  Yellow, Light-Yellow, Dark Yellow, Mikayla, Straw Final    Appearance, UA 01/24/2023 Clear  Clear Final    Specific Gravity, UA 01/24/2023 1.025   Final    pH, UA 01/24/2023 6.0  5.0 - 8.5 Final    Protein, UA 01/24/2023 Trace (A)  Negative mg/dL Final    Glucose, UA 01/24/2023 Negative  Negative, Normal mg/dL Final    Ketones, UA 01/24/2023 Trace (A)  Negative mg/dL Final    Blood, UA 01/24/2023 Large (A)  Negative unit/L Final    Bilirubin, UA 01/24/2023 Small (A)  Negative mg/dL Final    Urobilinogen, UA 01/24/2023 0.2  0.2, 1.0, Normal mg/dL Final    Nitrites, UA 01/24/2023 Negative  Negative Final    Leukocyte Esterase, UA 01/24/2023 Large (A)  Negative unit/L Final    WBC 01/24/2023 13.6 (H)  4.0 - 11.5 x10(3)/mcL Final    RBC 01/24/2023 5.05  4.00 - 5.10 x10(6)/mcL Final    Hgb 01/24/2023 13.9  11.8 - 16.0 gm/dL Final    Hct 01/24/2023 43.4  36.0 - 48.0 % Final    MCV 01/24/2023 85.9  79.0 - 99.0 fL Final    MCH 01/24/2023 27.5  27.0 - 34.0 pg Final    MCHC 01/24/2023 32.0  31.0 - 37.0 g/dL Final    RDW 01/24/2023 14.5  11.0 - 14.5 % Final    Platelet 01/24/2023 363  140 - 371 x10(3)/mcL Final    MPV 01/24/2023 11.0  9.4 - 12.4 fL Final    Neut % 01/24/2023 72.1  37 - 73 % Final    Lymph % 01/24/2023 17.0 (L)  20 - 55 % Final    Mono % 01/24/2023 9.0  4.7 - 12.5 % Final    Eos % 01/24/2023 1.3  0.7 - 7 % Final    Basophil % 01/24/2023 0.3  0.1 - 1.2 % Final    Lymph # 01/24/2023 2.31  1.16 - 3.74 x10(3)/mcL Final    Neut # 01/24/2023  9.78 (H)  1.56 - 6.13 x10(3)/mcL Final    Mono # 01/24/2023 1.22 (H)  0.24 - 0.36 x10(3)/mcL Final    Eos # 01/24/2023 0.17  0.04 - 0.36 x10(3)/mcL Final    Baso # 01/24/2023 0.04  0.01 - 0.08 x10(3)/mcL Final    IG# 01/24/2023 0.04 (H)  0.0001 - 0.031 x10(3)/mcL Final    IG% 01/24/2023 0.3  0 - 0.5 % Final    NRBC% 01/24/2023 0.0  0 - 1 % Final    Bacteria, UA 01/24/2023 Rare  None Seen, Rare, Occasional /HPF Final    RBC, UA 01/24/2023 0-2  None Seen, 0-2, 3-5, 0-5 /HPF Final    WBC, UA 01/24/2023 3-5  None Seen, 0-2, 3-5, 0-5 /HPF Final    Squamous Epithelial Cells, UA 01/24/2023 Few (A)  None Seen, Rare, Occasional, Occ /HPF Final        All of the data above and below has been reviewed by myself and any further interpretations will be reflected in the assessment and plan.   The data includes review of external notes, and independent interpretation of lab results, x-rays, and imaging reports.  Active inflammatory arthritis is an illness that poses a threat to bodily function and even a threat to life in some cases.  Drug therapy to treat inflammatory arthritis usually requires intensive monitoring for toxicity.    Review of patient's allergies indicates:   Allergen Reactions    Penicillin Rash     Assessment/Plan:          Prev noted/prior plan:  (She tells me she went to get MRI knee but too large for the machine and to have weight loss surgery in the near future     She tells me she also has chronic HIV noted     No overt synovitis  Mouth mucosa moist; she tells me she has chronic intermittent dry mouth noted  Obese  DJD     Verbal education     Blood work to further evaluate     We will seek records re HIV most recent note     Cont current for now; sounds like mostly comfortable at rest     Revisit weeks     Contact us prn)     Last visit:     Interim lab 10/28/22:     SPEP normal     IgG 1745 little high  IgA 337  IgM 252 little high     Creat 0.89; alb 3.6        ALT 41  AST 42     CPK 93     WBC 9.5; Hgb  "12.7; plt 343     JUNAID neg RF 58+ CCP neg HepBsAgnegsAb+coreAbneg HCV neg Uric acid 6.2     C3 223  C4 40        ESR not done CRP 5.3        Interim records re HIV most recent note:        Prev noted:  (She tells me she went to get MRI knee but too large for the machine and to have weight loss surgery in the near future   She tells me she also has chronic HIV noted   No overt synovitis  Mouth mucosa moist; she tells me she has chronic intermittent dry mouth noted  Obese  DJD   Blood work to further evaluate   We will seek records re HIV most recent note   Voltaren 75mg bid prn   DM amaryl noted   Clonazepam noted   Wellbutrin   Prior cymbalta noted   Had MRSA abdominal skin abscess in early 2022 per very limited records   Cont current for now; sounds like mostly comfortable at rest)        She sounds like she has been doing worse and reports some interim joint swelling noted     Facial rash she tells me is rosacea but may also have component of seb derm; She tells me she has had the rash her whole life; She has never seen a Dermatologist but tells me she recently saw an Orthopedics doctor who told her is was likely a "rheumatoid rash" noted        No overt synovitis but some difficult with body habitus noted  See above     Verbal education and some written     Went over lab      I am okay with      adding plaquenil 300mg daily for now; she can call if joint swelling recurs     Refer to Ophthalmology for plaquenil exam     We will refer to Dermatology for the rash and skin exam     We will reseek records re HIV most recent note     We will also seek records from PCP most recent note     Cont Voltaren 75mg bid prn      Lyrica 75mg bid noted: she initially was not sure if using but I investigated and she got from MD in [place] interim and she recalls getting the voltaren and a bit back and forth about the lyrica and not sure what was said or plan ( told me "nothing" until I brought up the new meds noted) but to also " revisit in month and they can have the note sent to me and I can go over it with her     Revisit lab 2 weeks prior     Contact us prn     This visit:    Interim lab:  Lab Results   Component Value Date     01/24/2023    K 4.3 01/24/2023    CO2 28 01/24/2023    CALCIUM 9.9 01/24/2023    BUN 12.0 01/24/2023    CREATININE 1.24 01/24/2023    AST 40 (H) 01/24/2023    ALT 40 01/24/2023    BILITOT 0.8 01/24/2023    ALKPHOS 101 01/24/2023    WBC 13.6 (H) 01/24/2023    HGB 13.9 01/24/2023    MCV 85.9 01/24/2023     01/24/2023    SEDRATE 93 (H) 01/24/2023    WBCUA 3-5 01/24/2023    RBCUA 0-2 01/24/2023    BACTERIA Rare 01/24/2023    PROTEINUA Trace (A) 01/24/2023         GFR 57    WBC 13.6    ESR 93 CRP 6.5    Interim reseek records re HIV most recent note:       Interim seek records from PCP Areli Adamson some of most recent note 11/7/22: cc AIDS 2014; low level virus lat visit neg for resistance; followed by Ortho chronic knee pain; recent cellulitis with clindamycin;seeing rheumatoid arthritis doctor will start plaquenil; A/P: HIV, obese; HTN; depression; anxiety; irreg menstruation; COVID; DM2; GERD; hip pain; keep appt with Ortho       Prev noted:  (She tells me she went to get MRI knee but too large for the machine and to have weight loss surgery in the near future   She tells me she also has chronic HIV noted   No overt synovitis  Mouth mucosa moist; she tells me she has chronic intermittent dry mouth noted  Obese  DJD   Blood work to further evaluate   We will seek records re HIV most recent note   Voltaren 75mg bid prn   DM amaryl noted   Clonazepam noted   Wellbutrin   Prior cymbalta noted   Had MRSA abdominal skin abscess in early 2022 per very limited records   Cont current for now; sounds like mostly comfortable at rest)     Prev noted/prior plan:   (She sounds like she has been doing worse and reports some interim joint swelling noted     Facial rash she tells me is rosacea but may also have  "component of seb derm; She tells me she has had the rash her whole life; She has never seen a Dermatologist but tells me she recently saw an Orthopedics doctor who told her is was likely a "rheumatoid rash" noted     No overt synovitis but some difficult with body habitus noted  See above   Verbal education and some written   Went over lab    I am okay with      adding plaquenil 300mg daily for now; she can call if joint swelling recurs   Refer to Ophthalmology for plaquenil exam     We will refer to Dermatology for the rash and skin exam   We will reseek records re HIV most recent note  We will also seek records from PCP most recent note     Cont Voltaren 75mg bid prn    Lyrica 75mg bid noted: she initially was not sure if using but I investigated and she got from MD in [place] interim and she recalls getting the voltaren and a bit back and forth about the lyrica and not sure what was said or plan ( told me "nothing" until I brought up the new meds noted) but to also revisit in month and they can have the note sent to me and I can go over it with her)    I am seeing that she is getting lyrica 150mg bid in ? Noted    She has some interim pain in hands, knees, back, feet ankles    No overt synovitis  DJD  Obese body habitus noted    Went over lab and we talked and listened for sometime    No interim abscess or infection per patient  She may see Dermatology in the future; she tells me she may have missed her appointment but is to contact    She will be careful with the voltaren given some mild CKD and she tells me she will be stopping after weight loss surgery    She has put in a good deal of effort with diet and weight loss modifying activities and is   hopeful she will have gastric bypass surgery as she has lost weight;   then she has plans to get MRI spine and knee when the machine can accommodate her    Much praise; she sounds like she is doing everything correctly talking with her which is great; drinking " plenty of fluids    She can hold the plaquenil after her surgery as she is to hold all tablets for 30 days noted        Revisit lab 2 weeks prior    Contact us prn     JUNAID neg RF93.7+ repeat RF 58+ CCP neg HepBsAgnegsAb+coreAbneg HCV neg Uric acid 6.2  There is some history to suggest inflammatory arthritis.     There is also some mechanical and neurogenic pain.     Lumbar DJD DDD per records     She should consider also working with Physical Medicine and Rehab MD if needed     She was to see/working with Ortho noted    Prev noted/prior plan:  (She tells me she went to get MRI knee but too large for the machine and to have weight loss surgery in the near future     She tells me she also has chronic HIV noted     No overt synovitis  Mouth mucosa moist; she tells me she has chronic intermittent dry mouth noted  Obese  DJD     Verbal education   Blood work to further evaluate   We will seek records re HIV most recent note   Cont current for now; sounds like mostly comfortable at rest   Revisit weeks   Contact us prn)     Has been managing with:     Voltaren 75mg bid prn     Has been working with:     DM amaryl noted     Clonazepam noted     Wellbutrin     Prior cymbalta noted     Had MRSA abdominal skin abscess in early 2022 per very limited records     HIV also mentioned on Medical history in Epic     We will seek records regarding the HIV infection     Records [facility] PCP some of note 8/16/22: f/u herniated disc lumbar; A/P: Labs ESR 76; RF 93.7; vitD 21.6; CRP 4.6; will place on home therapy for back and knees; refer to rheumatology; morbid obese weighs over 500lbs;  bariatric surgery discussed; add vitD 50,000 units weekly; consider MRI lumbar; revisit 4 weeks; diclofenac 75mg ibuprofen 800mg cymbalta 60mg metformin other.     Review of medical records as stated above.  Lab +/- imaging and other ordered diagnostic studies to further evaluate.  See the orders associated with this note visit.  Medications as  prescribed as tolerated.    Education including verbal and those noted in the patient instructions.  Revisit as scheduled and call prn.    The following diagnoses influence medical decision making and/or need further workup including the orders listed below:    Pain in other joint  -     CBC Auto Differential; Future; Expected date: 05/19/2023  -     CK; Future; Expected date: 05/19/2023  -     Comprehensive Metabolic Panel; Future; Expected date: 05/19/2023  -     C-Reactive Protein; Future; Expected date: 05/19/2023  -     Sedimentation rate; Future; Expected date: 05/19/2023  -     Urinalysis; Future; Expected date: 05/19/2023  -     Rheumatoid Factor; Future; Expected date: 05/19/2023    Polyarthralgia  -     CBC Auto Differential; Future; Expected date: 05/19/2023  -     CK; Future; Expected date: 05/19/2023  -     Comprehensive Metabolic Panel; Future; Expected date: 05/19/2023  -     C-Reactive Protein; Future; Expected date: 05/19/2023  -     Sedimentation rate; Future; Expected date: 05/19/2023  -     Urinalysis; Future; Expected date: 05/19/2023  -     Rheumatoid Factor; Future; Expected date: 05/19/2023  -     hydrOXYchloroQUINE (PLAQUENIL) 200 mg tablet; Take 1.5 tablets (300 mg total) by mouth once daily.  Dispense: 45 tablet; Refill: 4    Other osteoarthritis involving multiple joints  -     CBC Auto Differential; Future; Expected date: 05/19/2023  -     CK; Future; Expected date: 05/19/2023  -     Comprehensive Metabolic Panel; Future; Expected date: 05/19/2023  -     C-Reactive Protein; Future; Expected date: 05/19/2023  -     Sedimentation rate; Future; Expected date: 05/19/2023  -     Urinalysis; Future; Expected date: 05/19/2023  -     Rheumatoid Factor; Future; Expected date: 05/19/2023  -     hydrOXYchloroQUINE (PLAQUENIL) 200 mg tablet; Take 1.5 tablets (300 mg total) by mouth once daily.  Dispense: 45 tablet; Refill: 4    Lumbar degenerative disc disease  -     CBC Auto Differential; Future;  Expected date: 05/19/2023  -     CK; Future; Expected date: 05/19/2023  -     Comprehensive Metabolic Panel; Future; Expected date: 05/19/2023  -     C-Reactive Protein; Future; Expected date: 05/19/2023  -     Sedimentation rate; Future; Expected date: 05/19/2023  -     Urinalysis; Future; Expected date: 05/19/2023  -     Rheumatoid Factor; Future; Expected date: 05/19/2023    Myalgia  -     CBC Auto Differential; Future; Expected date: 05/19/2023  -     CK; Future; Expected date: 05/19/2023  -     Comprehensive Metabolic Panel; Future; Expected date: 05/19/2023  -     C-Reactive Protein; Future; Expected date: 05/19/2023  -     Sedimentation rate; Future; Expected date: 05/19/2023  -     Urinalysis; Future; Expected date: 05/19/2023  -     Rheumatoid Factor; Future; Expected date: 05/19/2023    Renal insufficiency    Screening for rheumatic disorder  -     CBC Auto Differential; Future; Expected date: 05/19/2023  -     CK; Future; Expected date: 05/19/2023  -     Comprehensive Metabolic Panel; Future; Expected date: 05/19/2023  -     C-Reactive Protein; Future; Expected date: 05/19/2023  -     Sedimentation rate; Future; Expected date: 05/19/2023  -     Urinalysis; Future; Expected date: 05/19/2023  -     Rheumatoid Factor; Future; Expected date: 05/19/2023    Nonspecific abnormal finding  -     CBC Auto Differential; Future; Expected date: 05/19/2023  -     CK; Future; Expected date: 05/19/2023  -     Comprehensive Metabolic Panel; Future; Expected date: 05/19/2023  -     C-Reactive Protein; Future; Expected date: 05/19/2023  -     Sedimentation rate; Future; Expected date: 05/19/2023  -     Urinalysis; Future; Expected date: 05/19/2023  -     Rheumatoid Factor; Future; Expected date: 05/19/2023  -     hydrOXYchloroQUINE (PLAQUENIL) 200 mg tablet; Take 1.5 tablets (300 mg total) by mouth once daily.  Dispense: 45 tablet; Refill: 4      Follow up in about 4 months (around 6/3/2023).     Richard Peoples MD

## 2023-01-23 ENCOUNTER — TELEPHONE (OUTPATIENT)
Dept: OBSTETRICS AND GYNECOLOGY | Facility: CLINIC | Age: 38
End: 2023-01-23
Payer: MEDICAID

## 2023-01-24 ENCOUNTER — APPOINTMENT (OUTPATIENT)
Dept: LAB | Facility: HOSPITAL | Age: 38
End: 2023-01-24
Attending: INTERNAL MEDICINE
Payer: MEDICAID

## 2023-01-24 DIAGNOSIS — M79.10 MYALGIA: ICD-10-CM

## 2023-01-24 DIAGNOSIS — Z13.89 SCREENING FOR RHEUMATIC DISORDER: ICD-10-CM

## 2023-01-24 DIAGNOSIS — M15.8 OTHER POLYOSTEOARTHRITIS: ICD-10-CM

## 2023-01-24 DIAGNOSIS — M25.50 POLYARTHRALGIA: ICD-10-CM

## 2023-01-24 DIAGNOSIS — R68.89 NONSPECIFIC ABNORMAL FINDING: ICD-10-CM

## 2023-01-24 DIAGNOSIS — Z51.81 MEDICATION MONITORING ENCOUNTER: ICD-10-CM

## 2023-01-24 DIAGNOSIS — M51.36 LUMBAR DEGENERATIVE DISC DISEASE: ICD-10-CM

## 2023-01-24 DIAGNOSIS — R21 RASH: ICD-10-CM

## 2023-01-24 DIAGNOSIS — M25.59 PAIN IN OTHER SPECIFIED JOINT: Primary | ICD-10-CM

## 2023-01-24 LAB
ALBUMIN SERPL-MCNC: 4.2 G/DL (ref 3.4–5)
ALBUMIN/GLOB SERPL: 1 RATIO
ALP SERPL-CCNC: 101 UNIT/L (ref 50–144)
ALT SERPL-CCNC: 40 UNIT/L (ref 1–45)
APPEARANCE UR: CLEAR
AST SERPL-CCNC: 40 UNIT/L (ref 14–36)
BACTERIA #/AREA URNS AUTO: ABNORMAL /HPF
BASOPHILS # BLD AUTO: 0.04 X10(3)/MCL (ref 0.01–0.08)
BASOPHILS NFR BLD AUTO: 0.3 % (ref 0.1–1.2)
BILIRUB UR QL STRIP.AUTO: ABNORMAL MG/DL
BILIRUBIN DIRECT+TOT PNL SERPL-MCNC: 0.8 MG/DL (ref 0–1)
BUN SERPL-MCNC: 12 MG/DL (ref 7–20)
C-REACTIVE PROTEIN(NORTH LA, ST. MARY, RP & JENNINGS): 6.5 MG/DL (ref 0–0.9)
CALCIUM SERPL-MCNC: 9.9 MG/DL (ref 8.4–10.2)
CHLORIDE SERPL-SCNC: 99 MMOL/L (ref 98–110)
CK SERPL-CCNC: 162 U/L (ref 30–135)
CO2 SERPL-SCNC: 28 MMOL/L (ref 21–32)
COLOR UR AUTO: YELLOW
CREAT SERPL-MCNC: 1.24 MG/DL (ref 0.66–1.25)
EOSINOPHIL # BLD AUTO: 0.17 X10(3)/MCL (ref 0.04–0.36)
EOSINOPHIL NFR BLD AUTO: 1.3 % (ref 0.7–7)
ERYTHROCYTE [DISTWIDTH] IN BLOOD BY AUTOMATED COUNT: 14.5 % (ref 11–14.5)
GFR SERPLBLD CREATININE-BSD FMLA CKD-EPI: 57 MLS/MIN/1.73/M2
GLOBULIN SER-MCNC: 4.1 GM/DL (ref 2–3.9)
GLUCOSE SERPL-MCNC: 120 MG/DL (ref 70–115)
GLUCOSE UR QL STRIP.AUTO: NEGATIVE MG/DL
HCT VFR BLD AUTO: 43.4 % (ref 36–48)
HGB BLD-MCNC: 13.9 GM/DL (ref 11.8–16)
IMM GRANULOCYTES # BLD AUTO: 0.04 X10(3)/MCL (ref 0–0.03)
IMM GRANULOCYTES NFR BLD AUTO: 0.3 % (ref 0–0.5)
KETONES UR QL STRIP.AUTO: ABNORMAL MG/DL
LEUKOCYTE ESTERASE UR QL STRIP.AUTO: ABNORMAL UNIT/L
LYMPHOCYTES # BLD AUTO: 2.31 X10(3)/MCL (ref 1.16–3.74)
LYMPHOCYTES NFR BLD AUTO: 17 % (ref 20–55)
MCH RBC QN AUTO: 27.5 PG (ref 27–34)
MCV RBC AUTO: 85.9 FL (ref 79–99)
MEAN CELL HEMOGLOBIN CONCENTRATION (OHS) G/DL: 32 G/DL (ref 31–37)
MONOCYTES # BLD AUTO: 1.22 X10(3)/MCL (ref 0.24–0.36)
MONOCYTES NFR BLD AUTO: 9 % (ref 4.7–12.5)
NEUTROPHILS # BLD AUTO: 9.78 X10(3)/MCL (ref 1.56–6.13)
NEUTROPHILS NFR BLD AUTO: 72.1 % (ref 37–73)
NITRITE UR QL STRIP.AUTO: NEGATIVE
NRBC BLD AUTO-RTO: 0 % (ref 0–1)
PH UR STRIP.AUTO: 6 [PH]
PLATELET # BLD AUTO: 363 X10(3)/MCL (ref 140–371)
PMV BLD AUTO: 11 FL (ref 9.4–12.4)
POTASSIUM SERPL-SCNC: 4.3 MMOL/L (ref 3.5–5.1)
PROT SERPL-MCNC: 8.3 GM/DL (ref 6.3–8.2)
PROT UR QL STRIP.AUTO: ABNORMAL MG/DL
RBC # BLD AUTO: 5.05 X10(6)/MCL (ref 4–5.1)
RBC #/AREA URNS AUTO: ABNORMAL /HPF
RBC UR QL AUTO: ABNORMAL UNIT/L
SODIUM SERPL-SCNC: 139 MMOL/L (ref 135–145)
SP GR UR STRIP.AUTO: 1.02
SQUAMOUS #/AREA URNS AUTO: ABNORMAL /HPF
UROBILINOGEN UR STRIP-ACNC: 0.2 MG/DL
WBC # SPEC AUTO: 13.6 X10(3)/MCL (ref 4–11.5)
WBC #/AREA URNS AUTO: ABNORMAL /HPF

## 2023-01-24 PROCEDURE — 36415 COLL VENOUS BLD VENIPUNCTURE: CPT | Performed by: INTERNAL MEDICINE

## 2023-01-26 LAB — ERYTHROCYTE [SEDIMENTATION RATE] IN BLOOD: 93 MM/HR (ref 0–20)

## 2023-01-29 ENCOUNTER — HOSPITAL ENCOUNTER (EMERGENCY)
Facility: HOSPITAL | Age: 38
Discharge: HOME | End: 2023-01-29
Payer: MEDICAID

## 2023-01-29 VITALS
BODY MASS INDEX: 50.02 KG/M2 | RESPIRATION RATE: 20 BRPM | HEART RATE: 95 BPM | DIASTOLIC BLOOD PRESSURE: 86 MMHG | TEMPERATURE: 98 F | HEIGHT: 64 IN | WEIGHT: 293 LBS | SYSTOLIC BLOOD PRESSURE: 138 MMHG | OXYGEN SATURATION: 100 %

## 2023-01-29 DIAGNOSIS — M79.601 PAIN OF RIGHT UPPER EXTREMITY: Primary | ICD-10-CM

## 2023-01-29 PROCEDURE — 99281 EMR DPT VST MAYX REQ PHY/QHP: CPT

## 2023-01-30 NOTE — ED PROVIDER NOTES
"Encounter Date: 1/29/2023       History     Chief Complaint   Patient presents with    Arm Pain     AMB TO ED WITH C/O PAIN TO R ARM SINCE LAB WORK WAS DONE 5 DAYS AGO. SHARP SHOCKING PAIN FROM AC TO TIPS OF FINGERS.     C/o rt forearm pain since phlebotomy 1/24/23 "from the second she put the needle in my arm I have had pain"    Review of patient's allergies indicates:   Allergen Reactions    Penicillin Rash     Past Medical History:   Diagnosis Date    Acid reflux     Anxiety     Arthritis     Diabetes mellitus     Dry mouth     HIV infection     Hypertension      History reviewed. No pertinent surgical history.  History reviewed. No pertinent family history.  Social History     Tobacco Use    Smoking status: Never    Smokeless tobacco: Never   Substance Use Topics    Alcohol use: Not Currently    Drug use: Yes     Types: Marijuana     Review of Systems   Musculoskeletal:         RFA pain   All other systems reviewed and are negative.    Physical Exam     Initial Vitals [01/29/23 1743]   BP Pulse Resp Temp SpO2   (!) 157/77 99 20 97.5 °F (36.4 °C) 99 %      MAP       --         Physical Exam    Nursing note and vitals reviewed.  Constitutional: She appears well-developed and well-nourished.   HENT:   Head: Normocephalic and atraumatic.   Eyes: Conjunctivae and EOM are normal. Pupils are equal, round, and reactive to light.   Neck: Neck supple.   Normal range of motion.  Cardiovascular:  Normal rate, regular rhythm, normal heart sounds and intact distal pulses.           Pulmonary/Chest: Breath sounds normal.   Abdominal: Abdomen is soft. Bowel sounds are normal.   Musculoskeletal:         General: Normal range of motion.      Cervical back: Normal range of motion and neck supple.      Comments: No erythema, discoloration or swelling to RFA/RUE     Neurological: She is alert and oriented to person, place, and time. She has normal strength.   Skin: Skin is warm and dry. Capillary refill takes less than 2 seconds. "   Psychiatric: She has a normal mood and affect. Her behavior is normal. Judgment and thought content normal.       ED Course   Procedures  Labs Reviewed - No data to display       Imaging Results    None          Medications - No data to display                           Clinical Impression:   Final diagnoses:  [M79.601] Pain of right upper extremity (Primary)        ED Disposition Condition    Discharge Stable          ED Prescriptions    None       Follow-up Information       Follow up With Specialties Details Why Contact Info    Areli Adamson NP Family Medicine Schedule an appointment as soon as possible for a visit   [address] [place] [id number]  [phone]               ROCHELLE Kwan  01/29/23 4863

## 2023-01-30 NOTE — ED NOTES
Pt presents with pain to right arm. Says she had blood work done 5 days ago and has since had a shooting pain from AC down to fingers. Says her fingers go numb and she has a hard time holding things or moving the arm. Distal pulses strong and equal.

## 2023-02-03 ENCOUNTER — OFFICE VISIT (OUTPATIENT)
Dept: RHEUMATOLOGY | Facility: CLINIC | Age: 38
End: 2023-02-03
Payer: MEDICAID

## 2023-02-03 VITALS
BODY MASS INDEX: 50.02 KG/M2 | WEIGHT: 293 LBS | SYSTOLIC BLOOD PRESSURE: 125 MMHG | DIASTOLIC BLOOD PRESSURE: 77 MMHG | OXYGEN SATURATION: 99 % | HEART RATE: 80 BPM | RESPIRATION RATE: 18 BRPM | HEIGHT: 64 IN

## 2023-02-03 DIAGNOSIS — M15.8 OTHER OSTEOARTHRITIS INVOLVING MULTIPLE JOINTS: ICD-10-CM

## 2023-02-03 DIAGNOSIS — M79.10 MYALGIA: ICD-10-CM

## 2023-02-03 DIAGNOSIS — R68.89 NONSPECIFIC ABNORMAL FINDING: ICD-10-CM

## 2023-02-03 DIAGNOSIS — M25.50 POLYARTHRALGIA: ICD-10-CM

## 2023-02-03 DIAGNOSIS — Z13.89 SCREENING FOR RHEUMATIC DISORDER: ICD-10-CM

## 2023-02-03 DIAGNOSIS — M25.59 PAIN IN OTHER JOINT: Primary | ICD-10-CM

## 2023-02-03 DIAGNOSIS — M51.36 LUMBAR DEGENERATIVE DISC DISEASE: ICD-10-CM

## 2023-02-03 DIAGNOSIS — N28.9 RENAL INSUFFICIENCY: ICD-10-CM

## 2023-02-03 PROCEDURE — 3078F DIAST BP <80 MM HG: CPT | Mod: CPTII,S$GLB,, | Performed by: INTERNAL MEDICINE

## 2023-02-03 PROCEDURE — 3074F SYST BP LT 130 MM HG: CPT | Mod: CPTII,S$GLB,, | Performed by: INTERNAL MEDICINE

## 2023-02-03 PROCEDURE — 3008F BODY MASS INDEX DOCD: CPT | Mod: CPTII,S$GLB,, | Performed by: INTERNAL MEDICINE

## 2023-02-03 PROCEDURE — 3008F PR BODY MASS INDEX (BMI) DOCUMENTED: ICD-10-PCS | Mod: CPTII,S$GLB,, | Performed by: INTERNAL MEDICINE

## 2023-02-03 PROCEDURE — 3074F PR MOST RECENT SYSTOLIC BLOOD PRESSURE < 130 MM HG: ICD-10-PCS | Mod: CPTII,S$GLB,, | Performed by: INTERNAL MEDICINE

## 2023-02-03 PROCEDURE — 1160F RVW MEDS BY RX/DR IN RCRD: CPT | Mod: CPTII,S$GLB,, | Performed by: INTERNAL MEDICINE

## 2023-02-03 PROCEDURE — 1159F PR MEDICATION LIST DOCUMENTED IN MEDICAL RECORD: ICD-10-PCS | Mod: CPTII,S$GLB,, | Performed by: INTERNAL MEDICINE

## 2023-02-03 PROCEDURE — 99214 PR OFFICE/OUTPT VISIT, EST, LEVL IV, 30-39 MIN: ICD-10-PCS | Mod: S$GLB,,, | Performed by: INTERNAL MEDICINE

## 2023-02-03 PROCEDURE — 1159F MED LIST DOCD IN RCRD: CPT | Mod: CPTII,S$GLB,, | Performed by: INTERNAL MEDICINE

## 2023-02-03 PROCEDURE — 1160F PR REVIEW ALL MEDS BY PRESCRIBER/CLIN PHARMACIST DOCUMENTED: ICD-10-PCS | Mod: CPTII,S$GLB,, | Performed by: INTERNAL MEDICINE

## 2023-02-03 PROCEDURE — 99214 OFFICE O/P EST MOD 30 MIN: CPT | Mod: S$GLB,,, | Performed by: INTERNAL MEDICINE

## 2023-02-03 PROCEDURE — 3078F PR MOST RECENT DIASTOLIC BLOOD PRESSURE < 80 MM HG: ICD-10-PCS | Mod: CPTII,S$GLB,, | Performed by: INTERNAL MEDICINE

## 2023-02-03 RX ORDER — HYDROXYCHLOROQUINE SULFATE 200 MG/1
300 TABLET, FILM COATED ORAL DAILY
Qty: 45 TABLET | Refills: 4 | Status: SHIPPED | OUTPATIENT
Start: 2023-02-03 | End: 2023-08-10

## 2023-02-03 RX ORDER — TRAZODONE HYDROCHLORIDE 50 MG/1
50 TABLET ORAL NIGHTLY
COMMUNITY
Start: 2023-01-25

## 2023-02-03 RX ORDER — SERTRALINE HYDROCHLORIDE 50 MG/1
50 TABLET, FILM COATED ORAL
COMMUNITY
Start: 2023-01-25

## 2023-02-06 ENCOUNTER — TELEPHONE (OUTPATIENT)
Dept: OBSTETRICS AND GYNECOLOGY | Facility: CLINIC | Age: 38
End: 2023-02-06
Payer: MEDICAID

## 2023-02-09 ENCOUNTER — HOSPITAL ENCOUNTER (OUTPATIENT)
Dept: RADIOLOGY | Facility: HOSPITAL | Age: 38
Discharge: HOME | End: 2023-02-09
Attending: NURSE PRACTITIONER
Payer: MEDICAID

## 2023-02-09 DIAGNOSIS — N93.9 ABNORMAL VAGINAL BLEEDING: ICD-10-CM

## 2023-02-09 PROCEDURE — 76856 US EXAM PELVIC COMPLETE: CPT | Mod: TC

## 2023-02-14 NOTE — PROGRESS NOTES
"ENDOMETRIAL BIOPSPY PROCEDURE NOTE    HPI:  Windy Ponce is a 38 y.o. female  with PMH of HIV and uncontrolled diabetes(states A1c last mo 12) presents with long history of sporadic bleeding. Reports daily bleeding for 3 months a few weeks prior to this latest episode, bleeding drom 23-23. Negative WOODY for ureaplasma, 23. Reports mild to moderate dysmenorrhea.     Last pap 22 normal. History of LSIL pap.      Review of Systems:   General/Constitutional: Chills denies. Fatigue/weakness denies. Fever denies . Night sweats denies . Hot flashes denies  Gastrointestinal: Abdominal pain denies. Blood in stool denies. Constipation denies. Diarrhea denies. Heartburn denies. Nausea denies. Vomiting denies   Genitourinary: Incontinence denies. Blood in urine denies. Frequent urination denies.  Urgency denies. Painful urination denies. Nocturia denies   Gynecologic: Irregular menses ADMITS.  Heavy bleeding  ADMITS.  Painful menses ADMItS.  Vaginal discharge denies. Vaginal odor denies. Vaginal itching/Irritation denies. Vaginal lesion denies.  Pelvic pain denies. Decreased libido denies. Vulvar lesion denies. Prolapse of genital organs denies. Painful intercourse denies. Postcoital bleeding denies   Psychiatric: Mood lability denies.  Depressed mood denies. Suicidal thoughts denies. Anxiety denies.  Overwhelmed denies.  Appetite normal. Energy level normal       O:  /74 (BP Location: Right arm)   Temp 97.5 °F (36.4 °C)   Ht 5' 4.96" (1.65 m)   Wt (!) 205.8 kg (453 lb 11.3 oz)   LMP 2023 Comment: last pap 2022 NIL +HPV  BMI 75.59 kg/m²     OB History    Para Term  AB Living   3 1 1   2 1   SAB IAB Ectopic Multiple Live Births           1      # Outcome Date GA Lbr Shilo/2nd Weight Sex Delivery Anes PTL Lv   3 AB  7w0d    SAB   ND   2 AB  11w0d    SAB   ND   1 Term 98    F Vag-Spont   ERIC       Procedure in Detail:   Chaperone present. "     Constitutional: General appearance: healthy, well-nourished and well-developed   Psychiatric: Orientation to time, place and person. Normal mood and affect and active, alert   Abdomen: Auscultation/Inspection/Palpation: No tenderness or masses. Soft, nondistended    Female Genitalia:      Vulva: no masses, atrophy or lesions      Bladder/Urethra: no urethral discharge or mass, normal meatus, bladder non-distended.      Vagina: no tenderness, erythema, cystocele, rectocele, abnormal vaginal discharge, or vesicle(s) or ulcers                   Cervix: no discharge or cervical motion tenderness and grossly normal      Uterus: normal size and shape and midline, non-tender, and no uterine prolapse.      Adnexa/Parametria: no parametrial tenderness or mass, no adnexal tenderness or ovarian mass.       PROCEDURE:   Verbal consent was obtained.      UPT negative.       Speculum was placed in the vagina. Cervix was cleaned with 3 iodine. The anterior lip of the cervix was then grasped with a single tooth tenaculum. Endometrial Pipette was then advanced into the uterus. Suction was then applied to the endometrial Pipette curettage and was performed to 360 degrees. Pipette was then removed from the uterus and the specimen was then placed in specimen cup adequate tissue was confirmed. Tenaculum was then removed from the patient's cervix.  Excellent hemostasis was achieved. All instruments removed from the patient's vagina.   The specimen was placed in formalyn and sent to Pathology for histology evaluation.The patient tolerated the procedure well.      Assessment:   1. Menometrorrhagia  Educated patient on abnormal uterine bleeding       Discussed with patient conservative management, medical management with possible progesterone only pills, Depo-Provera, Mirena IUD, or surgical management.       Patient desires EMB  Offered to do procedure today, patient desires. Qasim well  Bleeding precautions given  CBC, TSH, FT4    2.  Dysmenorrhea  Educated    NSAIDs, heating pad, warm bath    Pain/ER precautions     3. Morbid obesity  Morbid Obesity: discouraged pregnancy at this time due to obesity. Patient educated on risks and complications of obesity and pregnancy including but not limited to first trimester miscarriage recurrent miscarriages, congenital anomalies, HTN disorders, gestational diabetes, macrosomia, PTL&D,  higher risk of fetal demise in-utero and higher risk of CS (and wound complication including infection breakdown) with obesity.     Weight loss with diet and exercise modifications    4. HIV infection, unspecified symptom status  Gets tested q3-4 months. Tested 3 months ago, undetected.  Keep follow up    5. Type 2 diabetes mellitus without complication, unspecified whether long term insulin use  Latest A1C was 12.  Keep follow up with PCP       POST ENDOMETRIAL BIOPSY COUNSELING:  Manage post biopsy cramping with NSAIDs or Tylenol.  Expect spotting or light bleeding for a few days.  Report bleeding heavier than a period, fever > 101.0 F, worsening pain or a foul smelling vaginal discharge  Pelvic rest    All questions were answered.      Plan:     FOLLOW-UP: Pending biopsy results.

## 2023-02-15 ENCOUNTER — OFFICE VISIT (OUTPATIENT)
Dept: OBSTETRICS AND GYNECOLOGY | Facility: CLINIC | Age: 38
End: 2023-02-15
Payer: MEDICAID

## 2023-02-15 DIAGNOSIS — E66.01 MORBID OBESITY: ICD-10-CM

## 2023-02-15 DIAGNOSIS — B20 HIV INFECTION, UNSPECIFIED SYMPTOM STATUS: ICD-10-CM

## 2023-02-15 DIAGNOSIS — N94.6 DYSMENORRHEA: ICD-10-CM

## 2023-02-15 DIAGNOSIS — E11.9 TYPE 2 DIABETES MELLITUS WITHOUT COMPLICATION, UNSPECIFIED WHETHER LONG TERM INSULIN USE: ICD-10-CM

## 2023-02-15 DIAGNOSIS — N92.1 MENOMETRORRHAGIA: Primary | ICD-10-CM

## 2023-02-15 PROBLEM — Z21 HIV (HUMAN IMMUNODEFICIENCY VIRUS INFECTION): Status: ACTIVE | Noted: 2023-02-15

## 2023-02-15 LAB
B-HCG UR QL: NEGATIVE
CTP QC/QA: YES

## 2023-02-15 PROCEDURE — 99214 OFFICE O/P EST MOD 30 MIN: CPT | Mod: 25,,, | Performed by: OBSTETRICS & GYNECOLOGY

## 2023-02-15 PROCEDURE — 1159F MED LIST DOCD IN RCRD: CPT | Mod: CPTII,,, | Performed by: OBSTETRICS & GYNECOLOGY

## 2023-02-15 PROCEDURE — 81025 POCT URINE PREGNANCY: ICD-10-PCS | Mod: ,,, | Performed by: OBSTETRICS & GYNECOLOGY

## 2023-02-15 PROCEDURE — 58100 PR BIOPSY OF UTERUS LINING: ICD-10-PCS | Mod: ,,, | Performed by: OBSTETRICS & GYNECOLOGY

## 2023-02-15 PROCEDURE — 3078F PR MOST RECENT DIASTOLIC BLOOD PRESSURE < 80 MM HG: ICD-10-PCS | Mod: CPTII,,, | Performed by: OBSTETRICS & GYNECOLOGY

## 2023-02-15 PROCEDURE — 3075F PR MOST RECENT SYSTOLIC BLOOD PRESS GE 130-139MM HG: ICD-10-PCS | Mod: CPTII,,, | Performed by: OBSTETRICS & GYNECOLOGY

## 2023-02-15 PROCEDURE — 3078F DIAST BP <80 MM HG: CPT | Mod: CPTII,,, | Performed by: OBSTETRICS & GYNECOLOGY

## 2023-02-15 PROCEDURE — 58100 BIOPSY OF UTERUS LINING: CPT | Mod: ,,, | Performed by: OBSTETRICS & GYNECOLOGY

## 2023-02-15 PROCEDURE — 3008F BODY MASS INDEX DOCD: CPT | Mod: CPTII,,, | Performed by: OBSTETRICS & GYNECOLOGY

## 2023-02-15 PROCEDURE — 81025 URINE PREGNANCY TEST: CPT | Mod: ,,, | Performed by: OBSTETRICS & GYNECOLOGY

## 2023-02-15 PROCEDURE — 3075F SYST BP GE 130 - 139MM HG: CPT | Mod: CPTII,,, | Performed by: OBSTETRICS & GYNECOLOGY

## 2023-02-15 PROCEDURE — 1159F PR MEDICATION LIST DOCUMENTED IN MEDICAL RECORD: ICD-10-PCS | Mod: CPTII,,, | Performed by: OBSTETRICS & GYNECOLOGY

## 2023-02-15 PROCEDURE — 99214 PR OFFICE/OUTPT VISIT, EST, LEVL IV, 30-39 MIN: ICD-10-PCS | Mod: 25,,, | Performed by: OBSTETRICS & GYNECOLOGY

## 2023-02-15 PROCEDURE — 3008F PR BODY MASS INDEX (BMI) DOCUMENTED: ICD-10-PCS | Mod: CPTII,,, | Performed by: OBSTETRICS & GYNECOLOGY

## 2023-02-17 VITALS
WEIGHT: 293 LBS | DIASTOLIC BLOOD PRESSURE: 58 MMHG | BODY MASS INDEX: 48.82 KG/M2 | SYSTOLIC BLOOD PRESSURE: 120 MMHG | TEMPERATURE: 98 F | HEIGHT: 65 IN

## 2023-02-23 DIAGNOSIS — K21.9 GASTROESOPHAGEAL REFLUX DISEASE, UNSPECIFIED WHETHER ESOPHAGITIS PRESENT: Primary | ICD-10-CM

## 2023-02-24 ENCOUNTER — LAB VISIT (OUTPATIENT)
Dept: LAB | Facility: HOSPITAL | Age: 38
End: 2023-02-24
Attending: OBSTETRICS & GYNECOLOGY
Payer: MEDICAID

## 2023-02-24 DIAGNOSIS — N92.1 MENOMETRORRHAGIA: ICD-10-CM

## 2023-02-24 LAB
BASOPHILS # BLD AUTO: 0.03 X10(3)/MCL (ref 0.01–0.08)
BASOPHILS NFR BLD AUTO: 0.3 % (ref 0.1–1.2)
EOSINOPHIL # BLD AUTO: 0.17 X10(3)/MCL (ref 0.04–0.36)
EOSINOPHIL NFR BLD AUTO: 1.7 % (ref 0.7–7)
ERYTHROCYTE [DISTWIDTH] IN BLOOD BY AUTOMATED COUNT: 14.8 % (ref 11–14.5)
HCT VFR BLD AUTO: 42.4 % (ref 36–48)
HGB BLD-MCNC: 13.2 G/DL (ref 11.8–16)
IMM GRANULOCYTES # BLD AUTO: 0.02 X10(3)/MCL (ref 0–0.03)
IMM GRANULOCYTES NFR BLD AUTO: 0.2 % (ref 0–0.5)
LYMPHOCYTES # BLD AUTO: 2.32 X10(3)/MCL (ref 1.16–3.74)
LYMPHOCYTES NFR BLD AUTO: 23.3 % (ref 20–55)
MCH RBC QN AUTO: 26.6 PG (ref 27–34)
MCV RBC AUTO: 85.5 FL (ref 79–99)
MEAN CELL HEMOGLOBIN CONCENTRATION (OHS) G/DL: 31.1 G/DL (ref 31–37)
MONOCYTES # BLD AUTO: 0.76 X10(3)/MCL (ref 0.24–0.36)
MONOCYTES NFR BLD AUTO: 7.6 % (ref 4.7–12.5)
NEUTROPHILS # BLD AUTO: 6.66 X10(3)/MCL (ref 1.56–6.13)
NEUTROPHILS NFR BLD AUTO: 66.9 % (ref 37–73)
NRBC BLD AUTO-RTO: 0 % (ref 0–1)
PLATELET # BLD AUTO: 352 X10(3)/MCL (ref 140–371)
PMV BLD AUTO: 9.8 FL (ref 9.4–12.4)
RBC # BLD AUTO: 4.96 X10(6)/MCL (ref 4–5.1)
T4 FREE SERPL-MCNC: 1.15 NG/DL (ref 0.78–2.19)
TSH SERPL-ACNC: 1.74 UIU/ML (ref 0.36–3.74)
WBC # SPEC AUTO: 10 X10(3)/MCL (ref 4–11.5)

## 2023-02-24 PROCEDURE — 84443 ASSAY THYROID STIM HORMONE: CPT

## 2023-02-24 PROCEDURE — 36415 COLL VENOUS BLD VENIPUNCTURE: CPT

## 2023-02-24 PROCEDURE — 84439 ASSAY OF FREE THYROXINE: CPT

## 2023-02-24 PROCEDURE — 85025 COMPLETE CBC W/AUTO DIFF WBC: CPT

## 2023-03-02 ENCOUNTER — OFFICE VISIT (OUTPATIENT)
Dept: OBSTETRICS AND GYNECOLOGY | Facility: CLINIC | Age: 38
End: 2023-03-02
Payer: MEDICAID

## 2023-03-02 VITALS
TEMPERATURE: 98 F | DIASTOLIC BLOOD PRESSURE: 68 MMHG | WEIGHT: 293 LBS | BODY MASS INDEX: 50.02 KG/M2 | SYSTOLIC BLOOD PRESSURE: 122 MMHG | HEIGHT: 64 IN

## 2023-03-02 DIAGNOSIS — N71.1 CHRONIC ENDOMETRITIS: ICD-10-CM

## 2023-03-02 DIAGNOSIS — N93.9 ABNORMAL UTERINE BLEEDING: Primary | ICD-10-CM

## 2023-03-02 PROCEDURE — 1159F MED LIST DOCD IN RCRD: CPT | Mod: CPTII,,, | Performed by: OBSTETRICS & GYNECOLOGY

## 2023-03-02 PROCEDURE — 3008F BODY MASS INDEX DOCD: CPT | Mod: CPTII,,, | Performed by: OBSTETRICS & GYNECOLOGY

## 2023-03-02 PROCEDURE — 99214 PR OFFICE/OUTPT VISIT, EST, LEVL IV, 30-39 MIN: ICD-10-PCS | Mod: ,,, | Performed by: OBSTETRICS & GYNECOLOGY

## 2023-03-02 PROCEDURE — 3074F SYST BP LT 130 MM HG: CPT | Mod: CPTII,,, | Performed by: OBSTETRICS & GYNECOLOGY

## 2023-03-02 PROCEDURE — 3008F PR BODY MASS INDEX (BMI) DOCUMENTED: ICD-10-PCS | Mod: CPTII,,, | Performed by: OBSTETRICS & GYNECOLOGY

## 2023-03-02 PROCEDURE — 3078F DIAST BP <80 MM HG: CPT | Mod: CPTII,,, | Performed by: OBSTETRICS & GYNECOLOGY

## 2023-03-02 PROCEDURE — 1159F PR MEDICATION LIST DOCUMENTED IN MEDICAL RECORD: ICD-10-PCS | Mod: CPTII,,, | Performed by: OBSTETRICS & GYNECOLOGY

## 2023-03-02 PROCEDURE — 99214 OFFICE O/P EST MOD 30 MIN: CPT | Mod: ,,, | Performed by: OBSTETRICS & GYNECOLOGY

## 2023-03-02 PROCEDURE — 3078F PR MOST RECENT DIASTOLIC BLOOD PRESSURE < 80 MM HG: ICD-10-PCS | Mod: CPTII,,, | Performed by: OBSTETRICS & GYNECOLOGY

## 2023-03-02 PROCEDURE — 3074F PR MOST RECENT SYSTOLIC BLOOD PRESSURE < 130 MM HG: ICD-10-PCS | Mod: CPTII,,, | Performed by: OBSTETRICS & GYNECOLOGY

## 2023-03-02 RX ORDER — DOXYCYCLINE HYCLATE 100 MG
100 TABLET ORAL 2 TIMES DAILY
Qty: 20 TABLET | Refills: 0 | Status: SHIPPED | OUTPATIENT
Start: 2023-03-02 | End: 2023-03-12

## 2023-03-02 NOTE — PROGRESS NOTES
Chief Complaint     F/U on EMB results     HPI:     Patient is a 38 y.o.  with PMH of HIV and uncontrolled diabetes presents to follow up pathology results secondary of long history of sporadic bleeding. EMB 2/15/23.      Past Medical History:   Diagnosis Date    Acid reflux     Anxiety     Arthritis     Diabetes mellitus     Dry mouth     HIV infection     Hypertension        History reviewed. No pertinent surgical history.    Family History   Problem Relation Age of Onset    Ovarian cancer Maternal Aunt     Breast cancer Paternal Aunt        OB History          3    Para   1    Term   1            AB   2    Living   1         SAB        IAB        Ectopic        Multiple        Live Births   1                 Current Outpatient Medications on File Prior to Visit   Medication Sig Dispense Refill    buPROPion (WELLBUTRIN XL) 150 MG TB24 tablet Take 450 mg by mouth.      clonazePAM (KLONOPIN) 0.5 MG tablet TAKE ONE TABLET BY MOUTH TWICE DAILY AS NEEDED FOR SEVERE anxiety      diclofenac (VOLTAREN) 75 MG EC tablet TAKE 1 TABLET BY MOUTH TWO TIMES A DAY WITH MEALS MAY TAKE WITH TYLENOL AVOID OTHER NSAIDS      ergocalciferol (ERGOCALCIFEROL) 50,000 unit Cap Take 50,000 Units by mouth every 7 days.      glimepiride (AMARYL) 1 MG tablet TAKE ONE TABLET BY MOUTH WITH BREAKFAST OR THE FIRST meal of THE DAY EVERY DAY      hydroCHLOROthiazide (HYDRODIURIL) 25 MG tablet Take 25 mg by mouth every morning.      hydrOXYchloroQUINE (PLAQUENIL) 200 mg tablet Take 1.5 tablets (300 mg total) by mouth once daily. 45 tablet 4    pantoprazole (PROTONIX) 40 MG tablet Take 40 mg by mouth once daily.      pregabalin (LYRICA) 75 MG capsule Take 75 mg by mouth 2 (two) times daily.      sertraline (ZOLOFT) 50 MG tablet Take 50 mg by mouth.      SYMTUZA [id number] mg Tab Take 1 tablet by mouth once daily.      traZODone (DESYREL) 50 MG tablet Take 50 mg by mouth every evening.       No current facility-administered  "medications on file prior to visit.       Review of Systems:   Patient reports no abdominal pain. She reports no hematuria, no abnormal bleeding, no flank pain, no trouble urinating, no incontinence, no rash, no lesion, no discharge, no vaginal odor, and no vaginal itching.     Physical Exam:    /68 (BP Location: Left arm)   Temp 97.5 °F (36.4 °C)   Ht 5' 3.78" (1.62 m)   Wt (!) 205.6 kg (453 lb 4.3 oz)   LMP 02/18/2023 Comment: last pap 09/07/2022 NIL +HPV EMB on 02/15/2023  BMI 78.34 kg/m²     Constitutional: General appearance: healthy, well-nourished and well-developed   Psychiatric:  Orientation to time, place and person. Normal mood and affect and active, alert   Abdomen: Auscultation/Inspection/Palpation: No tenderness or masses. Soft, nondistended       Assessment:   1. Abnormal uterine bleeding  Educated  Bleeding precautions  Reviewed pathology with patient   Call office if bleeding does not get better    EMB 2/15/23  - chronic endometritis     US pelvis 1/31/23    - Uterus: 6.6 x 4.4 x 5.0cm  - ES: 1.5cm  - ROV: 2.6 x 2.2 x 2.2cm  - LOV: 2.4 x 2.7 x 2.2cm    Labs 2/24/23  FT4: 1.15  TSH: 1.74  H&H: 13.2/42.4        2. Chronic endometritis  Educated  Questionable allergy to Penicillin with rash.    Overview:  Doxy 100BID x10d      "

## 2023-05-25 ENCOUNTER — LAB VISIT (OUTPATIENT)
Dept: LAB | Facility: HOSPITAL | Age: 38
End: 2023-05-25
Attending: INTERNAL MEDICINE
Payer: MEDICAID

## 2023-05-25 DIAGNOSIS — M25.50 PAIN IN JOINT, MULTIPLE SITES: ICD-10-CM

## 2023-05-25 DIAGNOSIS — M51.36 DEGENERATION OF LUMBAR INTERVERTEBRAL DISC: ICD-10-CM

## 2023-05-25 DIAGNOSIS — Z36.3 ENCOUNTER FOR ROUTINE SCREENING FOR MALFORMATION USING ULTRASONICS: ICD-10-CM

## 2023-05-25 DIAGNOSIS — R68.89 MECHANICAL AND MOTOR PROBLEMS WITH INTERNAL ORGANS: ICD-10-CM

## 2023-05-25 DIAGNOSIS — M15.8 OTHER POLYOSTEOARTHRITIS: ICD-10-CM

## 2023-05-25 DIAGNOSIS — M79.10 MYALGIA: ICD-10-CM

## 2023-05-25 DIAGNOSIS — M25.59 PAIN IN OTHER SPECIFIED JOINT: Primary | ICD-10-CM

## 2023-05-25 LAB
ALBUMIN SERPL-MCNC: 4 G/DL (ref 3.4–5)
ALBUMIN/GLOB SERPL: 1.1 RATIO
ALP SERPL-CCNC: 92 UNIT/L (ref 50–144)
ALT SERPL-CCNC: 26 UNIT/L (ref 1–45)
ANION GAP SERPL CALC-SCNC: 5 MEQ/L (ref 2–13)
AST SERPL-CCNC: 24 UNIT/L (ref 14–36)
BASOPHILS # BLD AUTO: 0.04 X10(3)/MCL (ref 0.01–0.08)
BASOPHILS NFR BLD AUTO: 0.4 % (ref 0.1–1.2)
BILIRUBIN DIRECT+TOT PNL SERPL-MCNC: 0.4 MG/DL (ref 0–1)
BUN SERPL-MCNC: 14 MG/DL (ref 7–20)
C-REACTIVE PROTEIN(NORTH LA, ST. MARY, RP & JENNINGS): 4.8 MG/DL (ref 0–0.9)
CALCIUM SERPL-MCNC: 9.2 MG/DL (ref 8.4–10.2)
CHLORIDE SERPL-SCNC: 105 MMOL/L (ref 98–110)
CK SERPL-CCNC: 36 U/L (ref 30–135)
CO2 SERPL-SCNC: 31 MMOL/L (ref 21–32)
CREAT SERPL-MCNC: 0.94 MG/DL (ref 0.66–1.25)
CREAT/UREA NIT SERPL: 15 (ref 12–20)
EOSINOPHIL # BLD AUTO: 0.24 X10(3)/MCL (ref 0.04–0.36)
EOSINOPHIL NFR BLD AUTO: 2.5 % (ref 0.7–7)
ERYTHROCYTE [DISTWIDTH] IN BLOOD BY AUTOMATED COUNT: 16 % (ref 11–14.5)
ERYTHROCYTE [SEDIMENTATION RATE] IN BLOOD: 50 MM/HR (ref 0–20)
GFR SERPLBLD CREATININE-BSD FMLA CKD-EPI: 79 MLS/MIN/1.73/M2
GLOBULIN SER-MCNC: 3.6 GM/DL (ref 2–3.9)
GLUCOSE SERPL-MCNC: 109 MG/DL (ref 70–115)
HCT VFR BLD AUTO: 43.2 % (ref 36–48)
HGB BLD-MCNC: 13.9 G/DL (ref 11.8–16)
IMM GRANULOCYTES # BLD AUTO: 0.02 X10(3)/MCL (ref 0–0.03)
IMM GRANULOCYTES NFR BLD AUTO: 0.2 % (ref 0–0.5)
LYMPHOCYTES # BLD AUTO: 2.39 X10(3)/MCL (ref 1.16–3.74)
LYMPHOCYTES NFR BLD AUTO: 24.5 % (ref 20–55)
MCH RBC QN AUTO: 27.5 PG (ref 27–34)
MCHC RBC AUTO-ENTMCNC: 32.2 G/DL (ref 31–37)
MCV RBC AUTO: 85.4 FL (ref 79–99)
MONOCYTES # BLD AUTO: 0.88 X10(3)/MCL (ref 0.24–0.36)
MONOCYTES NFR BLD AUTO: 9 % (ref 4.7–12.5)
NEUTROPHILS # BLD AUTO: 6.18 X10(3)/MCL (ref 1.56–6.13)
NEUTROPHILS NFR BLD AUTO: 63.4 % (ref 37–73)
NRBC BLD AUTO-RTO: 0 %
PLATELET # BLD AUTO: 309 X10(3)/MCL (ref 140–371)
PMV BLD AUTO: 10.3 FL (ref 9.4–12.4)
POTASSIUM SERPL-SCNC: 4.1 MMOL/L (ref 3.5–5.1)
PROT SERPL-MCNC: 7.6 GM/DL (ref 6.3–8.2)
RBC # BLD AUTO: 5.06 X10(6)/MCL (ref 4–5.1)
RHEUMATOID FACT SERPL-ACNC: 57.7 IU/ML
SODIUM SERPL-SCNC: 141 MMOL/L (ref 135–145)
WBC # SPEC AUTO: 9.75 X10(3)/MCL (ref 4–11.5)

## 2023-05-25 PROCEDURE — 85652 RBC SED RATE AUTOMATED: CPT

## 2023-05-25 PROCEDURE — 85025 COMPLETE CBC W/AUTO DIFF WBC: CPT

## 2023-05-25 PROCEDURE — 80053 COMPREHEN METABOLIC PANEL: CPT

## 2023-05-25 PROCEDURE — 36415 COLL VENOUS BLD VENIPUNCTURE: CPT

## 2023-05-25 PROCEDURE — 86140 C-REACTIVE PROTEIN: CPT

## 2023-05-25 PROCEDURE — 86431 RHEUMATOID FACTOR QUANT: CPT

## 2023-05-25 PROCEDURE — 82550 ASSAY OF CK (CPK): CPT

## 2023-07-12 NOTE — PROGRESS NOTES
Chief Complaint:     Chief Complaint   Patient presents with    Vaginal Itching     +Painful yeast bumps after treatment with antibiotics         HPI:   38 y.o.  female   presents with c/o vaginal itching and small, painful  yeast bumps x1 week aft er taking antibiotics d/t a surgery x3 wks ago.  Pt reports she normally gets yeast infections when taking antibiotics. Pt reports Gastric Bypass surgery 2023, pt reports loss ~115 lbs since 2022. Pt reports no sexual activities since 2022.    LMP: 23  Frequency: irreg  Cycle Length: 5-7 days   Flow: normal  Intermenstrual Bleeding: No  Postcoital Bleeding: No  Dysmenorrhea: No  Sexually Active: not currently  Dyspareunia: No  Contraception: no  H/o STI: HIV in , undetected as of 2022.   Last pap: 2022, Negative, One Swab Negative GC/CZ/TV, MH/MG, +UU and +HPV 59  H/o abnl Pap: yes, Pap 11/11/15 LSIL, neg HPV  Gardasil: x0  MMG: n/a  H/o abnl MMG: n/a      Current Outpatient Medications:     buPROPion (WELLBUTRIN XL) 150 MG TB24 tablet, Take 450 mg by mouth., Disp: , Rfl:     clonazePAM (KLONOPIN) 0.5 MG tablet, TAKE ONE TABLET BY MOUTH TWICE DAILY AS NEEDED FOR SEVERE anxiety, Disp: , Rfl:     hydrOXYchloroQUINE (PLAQUENIL) 200 mg tablet, Take 1.5 tablets (300 mg total) by mouth once daily., Disp: 45 tablet, Rfl: 4    pantoprazole (PROTONIX) 40 MG tablet, Take 40 mg by mouth once daily., Disp: , Rfl:     pregabalin (LYRICA) 75 MG capsule, Take 75 mg by mouth 2 (two) times daily., Disp: , Rfl:     sertraline (ZOLOFT) 50 MG tablet, Take 50 mg by mouth., Disp: , Rfl:     SYMTUZA [id number] mg Tab, Take 1 tablet by mouth once daily., Disp: , Rfl:     traZODone (DESYREL) 50 MG tablet, Take 50 mg by mouth every evening., Disp: , Rfl:     diclofenac (VOLTAREN) 75 MG EC tablet, TAKE 1 TABLET BY MOUTH TWO TIMES A DAY WITH MEALS MAY TAKE WITH TYLENOL AVOID OTHER NSAIDS, Disp: , Rfl:     ergocalciferol (ERGOCALCIFEROL) 50,000 unit Cap,  Take 50,000 Units by mouth every 7 days., Disp: , Rfl:     glimepiride (AMARYL) 1 MG tablet, TAKE ONE TABLET BY MOUTH WITH BREAKFAST OR THE FIRST meal of THE DAY EVERY DAY, Disp: , Rfl:     hydroCHLOROthiazide (HYDRODIURIL) 25 MG tablet, Take 25 mg by mouth every morning., Disp: , Rfl:     metroNIDAZOLE (FLAGYL) 500 MG tablet, Take 1 tablet (500 mg total) by mouth every 12 (twelve) hours. for 7 days, Disp: 14 tablet, Rfl: 0    terconazole (TERAZOL 7) 0.4 % Crea, Place 1 applicator vaginally every evening., Disp: 7 g, Rfl: 0    Review of patient's allergies indicates:   Allergen Reactions    Red dye Diarrhea     Pt to test with MD soon to verify if allergic     Penicillin Rash       Social History     Tobacco Use    Smoking status: Never    Smokeless tobacco: Never   Substance Use Topics    Alcohol use: Not Currently    Drug use: Not Currently     Types: Marijuana       Review of Systems   Constitutional:  Negative for appetite change, chills, fatigue, fever and unexpected weight change.   Gastrointestinal:  Negative for abdominal pain, blood in stool, constipation, diarrhea, nausea, vomiting and reflux.   Genitourinary:  Negative for bladder incontinence, decreased libido, dysmenorrhea, dyspareunia, dysuria, flank pain, frequency, genital sores, hematuria, hot flashes, menorrhagia, menstrual problem, pelvic pain, urgency, vaginal bleeding, vaginal discharge, vaginal pain, urinary incontinence, postcoital bleeding, postmenopausal bleeding, vaginal dryness and vaginal odor.        +vaginal itching   + painful yeast bumps    Integumentary:  Negative for rash, acne, hair changes and mole/lesion.   Neurological:  Negative for headaches.        Physical Exam:   Vitals:    07/13/23 0858   BP: 130/72   BP Location: Right arm   Patient Position: Sitting   Weight: (!) 179.6 kg (395 lb 15.1 oz)       Body mass index is 68.43 kg/m².    Physical Exam  Constitutional:       Appearance: She is well-developed.   Abdominal:       General: Abdomen is flat. Bowel sounds are normal. There is no distension.      Palpations: Abdomen is soft. There is no mass.      Tenderness: There is no abdominal tenderness.      Hernia: No hernia is present.   Genitourinary:     Vagina: Vaginal discharge present. No erythema, tenderness or bleeding.      Cervix: No cervical motion tenderness or discharge.      Uterus: Not enlarged and not tender.       Adnexa:         Right: No mass, tenderness or fullness.          Left: No mass, tenderness or fullness.     Neurological:      Mental Status: She is alert and oriented to person, place, and time.   Psychiatric:         Attention and Perception: Attention normal.         Mood and Affect: Mood normal.           Assessment:     Patient Active Problem List   Diagnosis    Menometrorrhagia    BMI 70 and over, adult    HIV (human immunodeficiency virus infection)    Diabetes    Chronic endometritis       Health Maintenance Due   Topic Date Due    Hepatitis C Screening  Never done    Cervical Cancer Screening  Never done    Lipid Panel  Never done    COVID-19 Vaccine (1) Never done    Pneumococcal Vaccines (Age 0-64) (1 - PCV) Never done    TETANUS VACCINE  06/13/2018     Health Maintenance Topics with due status: Not Due       Topic Last Completion Date    Influenza Vaccine Not Due           Plan:    Windy was seen today for vaginal itching.    Diagnoses and all orders for this visit:    Vulvovaginal candidiasis  - AVOID: Scented soaps or shampoos, Bubble bath, Scented detergens, Feminine sprays, douches, powders    - Fragrance-free pH neutral soap    - Unscented detergents    Rx: Terazol 7  -     terconazole (TERAZOL 7) 0.4 % Crea; Place 1 applicator vaginally every evening.    Vagina itching  -     POCT Wet Prep (+yeast and +TV)  -     POCT KOH    Trichomonal vaginitis  Rx: Flagyl 500mg BID x7 days    -     metroNIDAZOLE (FLAGYL) 500 MG tablet; Take 1 tablet (500 mg total) by mouth every 12 (twelve) hours. for 7  days    Pt to RTC in 5 weeks for WOODY.

## 2023-07-13 ENCOUNTER — OFFICE VISIT (OUTPATIENT)
Dept: OBSTETRICS AND GYNECOLOGY | Facility: CLINIC | Age: 38
End: 2023-07-13
Payer: MEDICAID

## 2023-07-13 VITALS — SYSTOLIC BLOOD PRESSURE: 130 MMHG | DIASTOLIC BLOOD PRESSURE: 72 MMHG | WEIGHT: 293 LBS | BODY MASS INDEX: 68.43 KG/M2

## 2023-07-13 DIAGNOSIS — N89.8 VAGINA ITCHING: ICD-10-CM

## 2023-07-13 DIAGNOSIS — B37.31 VULVOVAGINAL CANDIDIASIS: Primary | ICD-10-CM

## 2023-07-13 DIAGNOSIS — A59.01 TRICHOMONAL VAGINITIS: ICD-10-CM

## 2023-07-13 LAB
BACTERIA HYPHAE, POC: NEGATIVE
GARDNERELLA VAGINALIS: NEGATIVE
OTHER MICROSC. OBSERVATIONS: NEGATIVE
POC BACTERIAL VAGINOSIS: NEGATIVE
POC CLUE CELLS: NEGATIVE
TRICHOMONAS, POC: POSITIVE
YEAST WET PREP: POSITIVE
YEAST, POC: POSITIVE

## 2023-07-13 PROCEDURE — 87210 SMEAR WET MOUNT SALINE/INK: CPT | Mod: QW,,, | Performed by: NURSE PRACTITIONER

## 2023-07-13 PROCEDURE — 3075F PR MOST RECENT SYSTOLIC BLOOD PRESS GE 130-139MM HG: ICD-10-PCS | Mod: CPTII,,, | Performed by: NURSE PRACTITIONER

## 2023-07-13 PROCEDURE — 3008F PR BODY MASS INDEX (BMI) DOCUMENTED: ICD-10-PCS | Mod: CPTII,,, | Performed by: NURSE PRACTITIONER

## 2023-07-13 PROCEDURE — 3008F BODY MASS INDEX DOCD: CPT | Mod: CPTII,,, | Performed by: NURSE PRACTITIONER

## 2023-07-13 PROCEDURE — 1160F PR REVIEW ALL MEDS BY PRESCRIBER/CLIN PHARMACIST DOCUMENTED: ICD-10-PCS | Mod: CPTII,,, | Performed by: NURSE PRACTITIONER

## 2023-07-13 PROCEDURE — 3078F DIAST BP <80 MM HG: CPT | Mod: CPTII,,, | Performed by: NURSE PRACTITIONER

## 2023-07-13 PROCEDURE — 87220 TISSUE EXAM FOR FUNGI: CPT | Mod: QW,,, | Performed by: NURSE PRACTITIONER

## 2023-07-13 PROCEDURE — 99213 OFFICE O/P EST LOW 20 MIN: CPT | Mod: ,,, | Performed by: NURSE PRACTITIONER

## 2023-07-13 PROCEDURE — 3075F SYST BP GE 130 - 139MM HG: CPT | Mod: CPTII,,, | Performed by: NURSE PRACTITIONER

## 2023-07-13 PROCEDURE — 99213 PR OFFICE/OUTPT VISIT, EST, LEVL III, 20-29 MIN: ICD-10-PCS | Mod: ,,, | Performed by: NURSE PRACTITIONER

## 2023-07-13 PROCEDURE — 3078F PR MOST RECENT DIASTOLIC BLOOD PRESSURE < 80 MM HG: ICD-10-PCS | Mod: CPTII,,, | Performed by: NURSE PRACTITIONER

## 2023-07-13 PROCEDURE — 87210 POCT WET PREP: ICD-10-PCS | Mod: QW,,, | Performed by: NURSE PRACTITIONER

## 2023-07-13 PROCEDURE — 1159F MED LIST DOCD IN RCRD: CPT | Mod: CPTII,,, | Performed by: NURSE PRACTITIONER

## 2023-07-13 PROCEDURE — 1159F PR MEDICATION LIST DOCUMENTED IN MEDICAL RECORD: ICD-10-PCS | Mod: CPTII,,, | Performed by: NURSE PRACTITIONER

## 2023-07-13 PROCEDURE — 1160F RVW MEDS BY RX/DR IN RCRD: CPT | Mod: CPTII,,, | Performed by: NURSE PRACTITIONER

## 2023-07-13 PROCEDURE — 87220 POCT KOH: ICD-10-PCS | Mod: QW,,, | Performed by: NURSE PRACTITIONER

## 2023-07-13 RX ORDER — METRONIDAZOLE 500 MG/1
500 TABLET ORAL EVERY 12 HOURS
Qty: 14 TABLET | Refills: 0 | Status: SHIPPED | OUTPATIENT
Start: 2023-07-13 | End: 2023-07-20

## 2023-07-13 RX ORDER — TERCONAZOLE 4 MG/G
1 CREAM VAGINAL NIGHTLY
Qty: 7 G | Refills: 0 | Status: SHIPPED | OUTPATIENT
Start: 2023-07-13

## 2023-08-07 NOTE — PROGRESS NOTES
Chief Complaint:     Chief Complaint   Patient presents with    Follow-up     F/U WOODY PAP 2023         HPI:   38 y.o.  female   presents for WOODY on +TV on 2023. Pt was treated with Flagyl  at [facility] on [facility]23. Pt reports she did not complete the Flagyl due to vomiting after taking,  took 6-8 pills. Pt c/o yeast infections in her crevices - under breasts, inner legs and under belly    LMP: 23  Frequency: irreg  Cycle Length: 5-7 days   Flow: normal  Intermenstrual Bleeding: No  Postcoital Bleeding: No  Dysmenorrhea: No  Sexually Active: not currently  Dyspareunia: No  Contraception: no  H/o STI: HIV  Last pap: 2023, Positive TV  H/o Abnormal Pap: yes  Colposcopy: yes        Current Outpatient Medications:     atenoloL (TENORMIN) 100 MG tablet, Take 100 mg by mouth., Disp: , Rfl:     buPROPion (WELLBUTRIN XL) 150 MG TB24 tablet, Take 450 mg by mouth., Disp: , Rfl:     CHILDREN'S ACETAMINOPHEN 160 mg/5 mL Susp suspension, SMARTSI Milliliter(s) By Mouth Every 4 Hours PRN, Disp: , Rfl:     clonazePAM (KLONOPIN) 0.5 MG tablet, Take 0.5 mg by mouth 2 (two) times daily as needed., Disp: , Rfl:     darunavir-aditi-emtri-tenof ala [id number] mg Tab, Take by mouth every morning., Disp: , Rfl:     ondansetron (ZOFRAN-ODT) 4 MG TbDL, DISSOLVE 1 TABLET UNDER TONGUE EVERY 8 HOURS AS NEEDED FOR NAUSEA/VOMITING, Disp: , Rfl:     sertraline (ZOLOFT) 50 MG tablet, Take 50 mg by mouth., Disp: , Rfl:     sertraline (ZOLOFT) 50 MG tablet, Take 50 mg by mouth., Disp: , Rfl:     terconazole (TERAZOL 7) 0.4 % Crea, Place 1 applicator vaginally every evening., Disp: 7 g, Rfl: 0    traZODone (DESYREL) 50 MG tablet, Take 50 mg by mouth every evening., Disp: , Rfl:     ursodioL (ACTIGALL) 250 mg Tab, TAKE 1 TABLET BY MOUTH TWO TIMES A DAY START 3 WEEKS AFTER SURGERY TAKE WITH MEALS, Disp: , Rfl:     nystatin (MYCOSTATIN) powder, Apply to affected area 3 times daily, Disp: 60 g, Rfl: 0    Review of  "patient's allergies indicates:   Allergen Reactions    Red dye Diarrhea     Pt to test with MD soon to verify if allergic     Penicillin Rash       Social History     Tobacco Use    Smoking status: Never    Smokeless tobacco: Never   Substance Use Topics    Alcohol use: Not Currently    Drug use: Not Currently     Types: Marijuana       Review of Systems:   General/Constitutional: Chills denies. Fatigue/weakness denies. Fever denies. Night sweats denies. Hot flashes denies    Respiratory: Cough denies. Hemoptysis denies. SOB denies. Sputum production denies. Wheezing denies .   Cardiovascular: Chest pain denies . Dizziness denies. Palpitations denies. Swelling in hands/feet denies    Gastrointestinal: Abdominal pain denies. Blood in stool denies. Constipation denies. Diarrhea denies. Heartburn denies. Nausea denies. Vomiting denies    Genitourinary: Incontinence denies. Blood in urine denies. Frequent urination denies. Painful urination denies. Urinary urgency denies. Nocturia denies    Gynecologic: Irregular menses denies. Heavy bleeding denies. Painful menses denies. Vaginal discharge denies. Vaginal odor denies. Vaginal itching denies. Vaginal lesion denies. Pelvic pain denies. Decreased libido denies. Vulvar lesion denies. Prolapse of genital organs denies. Painful intercourse denies. Postcoital bleeding denies    Psychiatric: Depression denies. Anxiety denies       Physical Exam:   Vitals:    08/10/23 1502   BP: 122/76   BP Location: Left arm   Patient Position: Sitting   Temp: 98.1 °F (36.7 °C)   TempSrc: Temporal   Weight: (!) 172.8 kg (381 lb)   Height: 5' 3" (1.6 m)       Body mass index is 67.49 kg/m².    Physical Exam  Constitutional:       Appearance: She is well-developed.   Abdominal:      General: Abdomen is flat. Bowel sounds are normal. There is no distension.      Palpations: Abdomen is soft. There is no mass.      Tenderness: There is no abdominal tenderness.      Hernia: No hernia is present. "   Genitourinary:     Vagina: No vaginal discharge, erythema, tenderness or bleeding.      Cervix: No cervical motion tenderness or discharge.   Neurological:      Mental Status: She is alert and oriented to person, place, and time.   Psychiatric:         Attention and Perception: Attention normal.         Mood and Affect: Mood normal.             Assessment:     Patient Active Problem List   Diagnosis    Menometrorrhagia    BMI 70 and over, adult    HIV (human immunodeficiency virus infection)    Diabetes    Chronic endometritis       Health Maintenance Due   Topic Date Due    Hepatitis C Screening  Never done    Cervical Cancer Screening  Never done    Lipid Panel  Never done    COVID-19 Vaccine (1) Never done    Pneumococcal Vaccines (Age 0-64) (1 - PCV) Never done    TETANUS VACCINE  06/13/2018     Health Maintenance Topics with due status: Not Due       Topic Last Completion Date    Influenza Vaccine Not Due           Plan:    Windy was seen today for follow-up.    Diagnoses and all orders for this visit:    Missed menses  UPT negative  -     POCT Urine Pregnancy    Trichomonas vaginalis (TV) infection  -     MDL Sendout Test    Other orders  -     nystatin (MYCOSTATIN) powder; Apply to affected area 3 times daily        Tested +: 07/13/2023 +TV     Treated: 07/13/2023 Treated with Flagyl 500mg BID x 7 days.     WOODY today: One Swab GC/CZ/TV

## 2023-08-10 ENCOUNTER — OFFICE VISIT (OUTPATIENT)
Dept: OBSTETRICS AND GYNECOLOGY | Facility: CLINIC | Age: 38
End: 2023-08-10
Payer: MEDICAID

## 2023-08-10 VITALS
DIASTOLIC BLOOD PRESSURE: 76 MMHG | SYSTOLIC BLOOD PRESSURE: 122 MMHG | BODY MASS INDEX: 51.91 KG/M2 | HEIGHT: 63 IN | WEIGHT: 293 LBS | TEMPERATURE: 98 F

## 2023-08-10 DIAGNOSIS — N92.6 MISSED MENSES: Primary | ICD-10-CM

## 2023-08-10 DIAGNOSIS — A59.01 TRICHOMONAS VAGINALIS (TV) INFECTION: ICD-10-CM

## 2023-08-10 LAB
B-HCG UR QL: NEGATIVE
CTP QC/QA: YES

## 2023-08-10 PROCEDURE — 3008F PR BODY MASS INDEX (BMI) DOCUMENTED: ICD-10-PCS | Mod: CPTII,,, | Performed by: NURSE PRACTITIONER

## 2023-08-10 PROCEDURE — 1159F MED LIST DOCD IN RCRD: CPT | Mod: CPTII,,, | Performed by: NURSE PRACTITIONER

## 2023-08-10 PROCEDURE — 3008F BODY MASS INDEX DOCD: CPT | Mod: CPTII,,, | Performed by: NURSE PRACTITIONER

## 2023-08-10 PROCEDURE — 81025 URINE PREGNANCY TEST: CPT | Mod: ,,, | Performed by: NURSE PRACTITIONER

## 2023-08-10 PROCEDURE — 3078F PR MOST RECENT DIASTOLIC BLOOD PRESSURE < 80 MM HG: ICD-10-PCS | Mod: CPTII,,, | Performed by: NURSE PRACTITIONER

## 2023-08-10 PROCEDURE — 99213 PR OFFICE/OUTPT VISIT, EST, LEVL III, 20-29 MIN: ICD-10-PCS | Mod: ,,, | Performed by: NURSE PRACTITIONER

## 2023-08-10 PROCEDURE — 3074F SYST BP LT 130 MM HG: CPT | Mod: CPTII,,, | Performed by: NURSE PRACTITIONER

## 2023-08-10 PROCEDURE — 1159F PR MEDICATION LIST DOCUMENTED IN MEDICAL RECORD: ICD-10-PCS | Mod: CPTII,,, | Performed by: NURSE PRACTITIONER

## 2023-08-10 PROCEDURE — 3074F PR MOST RECENT SYSTOLIC BLOOD PRESSURE < 130 MM HG: ICD-10-PCS | Mod: CPTII,,, | Performed by: NURSE PRACTITIONER

## 2023-08-10 PROCEDURE — 99213 OFFICE O/P EST LOW 20 MIN: CPT | Mod: ,,, | Performed by: NURSE PRACTITIONER

## 2023-08-10 PROCEDURE — 1160F RVW MEDS BY RX/DR IN RCRD: CPT | Mod: CPTII,,, | Performed by: NURSE PRACTITIONER

## 2023-08-10 PROCEDURE — 3078F DIAST BP <80 MM HG: CPT | Mod: CPTII,,, | Performed by: NURSE PRACTITIONER

## 2023-08-10 PROCEDURE — 1160F PR REVIEW ALL MEDS BY PRESCRIBER/CLIN PHARMACIST DOCUMENTED: ICD-10-PCS | Mod: CPTII,,, | Performed by: NURSE PRACTITIONER

## 2023-08-10 PROCEDURE — 81025 POCT URINE PREGNANCY: ICD-10-PCS | Mod: ,,, | Performed by: NURSE PRACTITIONER

## 2023-08-10 RX ORDER — SERTRALINE HYDROCHLORIDE 50 MG/1
50 TABLET, FILM COATED ORAL
COMMUNITY
Start: 2023-03-07

## 2023-08-10 RX ORDER — ACETAMINOPHEN 160 MG/5ML
SUSPENSION ORAL
COMMUNITY
Start: 2023-06-22

## 2023-08-10 RX ORDER — CLONAZEPAM 0.5 MG/1
0.5 TABLET ORAL 2 TIMES DAILY PRN
COMMUNITY
Start: 2022-09-07

## 2023-08-10 RX ORDER — NYSTATIN 100000 [USP'U]/G
POWDER TOPICAL
Qty: 60 G | Refills: 0 | Status: SHIPPED | OUTPATIENT
Start: 2023-08-10 | End: 2024-08-09

## 2023-08-10 RX ORDER — ATENOLOL 100 MG/1
100 TABLET ORAL
COMMUNITY
Start: 2023-08-03

## 2023-08-10 RX ORDER — ONDANSETRON 4 MG/1
TABLET, ORALLY DISINTEGRATING ORAL
COMMUNITY
Start: 2023-05-25

## 2023-08-10 RX ORDER — URSODIOL 250 MG/1
TABLET, FILM COATED ORAL
COMMUNITY
Start: 2023-05-25

## 2023-08-16 ENCOUNTER — PATIENT MESSAGE (OUTPATIENT)
Dept: OBSTETRICS AND GYNECOLOGY | Facility: CLINIC | Age: 38
End: 2023-08-16
Payer: MEDICAID

## 2023-08-16 RX ORDER — METRONIDAZOLE 500 MG/1
TABLET ORAL
Qty: 4 TABLET | Refills: 0 | Status: SHIPPED | OUTPATIENT
Start: 2023-08-16

## 2023-12-29 ENCOUNTER — HOSPITAL ENCOUNTER (EMERGENCY)
Facility: HOSPITAL | Age: 38
Discharge: HOME | End: 2023-12-29
Payer: MEDICAID

## 2023-12-29 VITALS
HEART RATE: 87 BPM | TEMPERATURE: 98 F | OXYGEN SATURATION: 100 % | SYSTOLIC BLOOD PRESSURE: 128 MMHG | RESPIRATION RATE: 13 BRPM | DIASTOLIC BLOOD PRESSURE: 89 MMHG | HEIGHT: 63 IN | WEIGHT: 293 LBS | BODY MASS INDEX: 51.91 KG/M2

## 2023-12-29 DIAGNOSIS — T40.2X1A OPIOID OVERDOSE, ACCIDENTAL OR UNINTENTIONAL, INITIAL ENCOUNTER: Primary | ICD-10-CM

## 2023-12-29 DIAGNOSIS — T50.901A ACCIDENTAL DRUG OVERDOSE, INITIAL ENCOUNTER: ICD-10-CM

## 2023-12-29 PROCEDURE — 96374 THER/PROPH/DIAG INJ IV PUSH: CPT

## 2023-12-29 PROCEDURE — 96361 HYDRATE IV INFUSION ADD-ON: CPT

## 2023-12-29 PROCEDURE — 25000003 PHARM REV CODE 250

## 2023-12-29 PROCEDURE — 63600175 PHARM REV CODE 636 W HCPCS

## 2023-12-29 PROCEDURE — 99284 EMERGENCY DEPT VISIT MOD MDM: CPT

## 2023-12-29 RX ORDER — NALOXONE HYDROCHLORIDE 1 MG/ML
INJECTION INTRAMUSCULAR; INTRAVENOUS; SUBCUTANEOUS
Status: COMPLETED
Start: 2023-12-29 | End: 2023-12-29

## 2023-12-29 RX ORDER — NALOXONE HCL 0.4 MG/ML
2 VIAL (ML) INJECTION
Status: DISCONTINUED | OUTPATIENT
Start: 2023-12-29 | End: 2023-12-29 | Stop reason: HOSPADM

## 2023-12-29 RX ORDER — NALOXONE HCL 0.4 MG/ML
2 VIAL (ML) INJECTION
Status: COMPLETED | OUTPATIENT
Start: 2023-12-29 | End: 2023-12-29

## 2023-12-29 RX ORDER — NALOXONE HYDROCHLORIDE 4 MG/.1ML
SPRAY NASAL
Qty: 1 EACH | Refills: 11 | Status: SHIPPED | OUTPATIENT
Start: 2023-12-29

## 2023-12-29 RX ORDER — ONDANSETRON 2 MG/ML
8 INJECTION INTRAMUSCULAR; INTRAVENOUS
Status: COMPLETED | OUTPATIENT
Start: 2023-12-29 | End: 2023-12-29

## 2023-12-29 RX ADMIN — SODIUM CHLORIDE 1000 ML: 9 INJECTION, SOLUTION INTRAVENOUS at 01:12

## 2023-12-29 RX ADMIN — ONDANSETRON 8 MG: 2 INJECTION INTRAMUSCULAR; INTRAVENOUS at 01:12

## 2023-12-29 RX ADMIN — NALOXONE HYDROCHLORIDE 4 MG: 1 INJECTION PARENTERAL at 01:12

## 2023-12-29 RX ADMIN — NALOXONE HYDROCHLORIDE 2 MG: 0.4 INJECTION, SOLUTION INTRAMUSCULAR; INTRAVENOUS; SUBCUTANEOUS at 01:12

## 2023-12-29 NOTE — ED PROVIDER NOTES
Encounter Date: 12/29/2023       History     Chief Complaint   Patient presents with    Drug Overdose     Onset pta pt brother drove up. Pt non responsive on arrival. Reports seizure. Iv started 20g right ac. Narcan given IN first then iv pt responsed after iv narcan.     38-year-old female arrives unconscious with a pinpoint pupils.  She revived almost instantly with IV Narcan.    The history is provided by the patient and a relative.     Review of patient's allergies indicates:   Allergen Reactions    Red dye Diarrhea     Pt to test with MD soon to verify if allergic     Penicillin Rash     Past Medical History:   Diagnosis Date    Acid reflux     Anxiety     Arthritis     Bipolar disorder, unspecified     Depression     Diabetes mellitus     Dry mouth     HIV infection     Hypertension     PTSD (post-traumatic stress disorder)      Past Surgical History:   Procedure Laterality Date    GASTRIC BYPASS  06/21/2023    GASTRIC BYPASS       Family History   Problem Relation Age of Onset    Seizures Mother     Hypertension Mother     Ovarian cancer Maternal Aunt     Breast cancer Paternal Aunt      Social History     Tobacco Use    Smoking status: Never    Smokeless tobacco: Never   Substance Use Topics    Alcohol use: Not Currently    Drug use: Not Currently     Types: Marijuana     Review of Systems   Constitutional:  Negative for fever.   HENT:  Negative for sore throat.    Respiratory:  Negative for shortness of breath.    Cardiovascular:  Negative for chest pain.   Gastrointestinal:  Negative for nausea.   Genitourinary:  Negative for dysuria.   Musculoskeletal:  Negative for back pain.   Skin:  Negative for rash.   Neurological:  Positive for syncope. Negative for weakness.        She became unconscious in the vehicle just prior to arrival.   Hematological:  Does not bruise/bleed easily.   All other systems reviewed and are negative.      Physical Exam     Initial Vitals   BP Pulse Resp Temp SpO2   12/29/23 0110  12/29/23 0110 12/29/23 0110 12/29/23 0112 12/29/23 0110   (!) 169/100 98 (!) 2 97.7 °F (36.5 °C) (!) 40 %      MAP       --                Physical Exam    Nursing note and vitals reviewed.  Constitutional: Vital signs are normal. She appears well-developed and well-nourished. She is cooperative.   HENT:   Head: Normocephalic and atraumatic.   Mouth/Throat: Oropharynx is clear and moist.   Eyes: Conjunctivae, EOM and lids are normal.   Upon arrival, pupils were pinpoint   Neck: Trachea normal. Neck supple.   Cardiovascular:  Normal rate, regular rhythm, normal heart sounds and intact distal pulses.           Pulmonary/Chest:   Upon arrival, she was only breathing 2-3 breaths per minute.   Abdominal: Abdomen is soft. Bowel sounds are normal.   Musculoskeletal:         General: Normal range of motion.      Cervical back: Normal and neck supple.      Lumbar back: Normal.     Neurological: She is alert and oriented to person, place, and time. She has normal strength. Coordination normal.   Skin: Skin is warm, dry and intact. Capillary refill takes less than 2 seconds.   Psychiatric: She has a normal mood and affect. Her speech is normal and behavior is normal. Judgment and thought content normal. Cognition and memory are normal.         ED Course   Procedures  Labs Reviewed   DRUG SCREEN, URINE (BEAKER)          Imaging Results    None          Medications   naloxone 0.4 mg/mL injection 2 mg (has no administration in time range)   sodium chloride 0.9% bolus 1,000 mL 1,000 mL (1,000 mLs Intravenous New Bag 12/29/23 0124)   naloxone (NARCAN) 1 mg/mL injection (4 mg  Given 12/29/23 0122)   naloxone 0.4 mg/mL injection 2 mg (2 mg Nasal Given 12/29/23 0130)   ondansetron injection 8 mg (8 mg Intravenous Given 12/29/23 0134)     Medical Decision Making  Opioid overdose, reversed with Narcan                                      Clinical Impression:  Final diagnoses:  [T40.2X1A] Opioid overdose, accidental or unintentional,  initial encounter (Primary)  [T50.901A] Accidental drug overdose, initial encounter          ED Disposition Condition    Discharge Good          ED Prescriptions       Medication Sig Dispense Start Date End Date Auth. Provider    naloxone (NARCAN) 4 mg/actuation Spry 4mg by nasal route as needed for opioid overdose; may repeat every 2-3 minutes in alternating nostrils until medical help arrives. Call 911 1 each 12/29/2023 -- Jim Katz MD          Follow-up Information       Follow up With Specialties Details Why Contact Areli Nichols NP Family Medicine Call today  [address] [facility] [phone]               Jim Katz MD  12/29/23 0156

## 2024-11-21 DIAGNOSIS — O10.919 CHRONIC HYPERTENSION AFFECTING PREGNANCY: ICD-10-CM

## 2024-11-21 DIAGNOSIS — O09.522 MULTIGRAVIDA OF ADVANCED MATERNAL AGE IN SECOND TRIMESTER: ICD-10-CM

## 2024-11-21 DIAGNOSIS — O98.719 MATERNAL HIV INFECTION DURING PREGNANCY: Primary | ICD-10-CM

## 2024-12-02 ENCOUNTER — OFFICE VISIT (OUTPATIENT)
Dept: MATERNAL FETAL MEDICINE | Facility: CLINIC | Age: 39
End: 2024-12-02
Payer: MEDICAID

## 2024-12-02 VITALS
DIASTOLIC BLOOD PRESSURE: 62 MMHG | RESPIRATION RATE: 18 BRPM | SYSTOLIC BLOOD PRESSURE: 90 MMHG | HEART RATE: 87 BPM | OXYGEN SATURATION: 99 % | BODY MASS INDEX: 41.45 KG/M2 | WEIGHT: 234 LBS

## 2024-12-02 DIAGNOSIS — O10.919 CHRONIC HYPERTENSION AFFECTING PREGNANCY: ICD-10-CM

## 2024-12-02 DIAGNOSIS — O09.522 ADVANCED MATERNAL AGE IN MULTIGRAVIDA, SECOND TRIMESTER: Primary | ICD-10-CM

## 2024-12-02 DIAGNOSIS — O98.719 MATERNAL HIV INFECTION DURING PREGNANCY: ICD-10-CM

## 2024-12-02 DIAGNOSIS — O09.522 MULTIGRAVIDA OF ADVANCED MATERNAL AGE IN SECOND TRIMESTER: ICD-10-CM

## 2024-12-02 RX ORDER — SERTRALINE HYDROCHLORIDE 100 MG/1
100 TABLET, FILM COATED ORAL
COMMUNITY
Start: 2024-11-01

## 2024-12-02 RX ORDER — B-COMPLEX WITH VITAMIN C
1 TABLET ORAL
COMMUNITY
Start: 2024-11-19

## 2024-12-02 RX ORDER — FOLIC ACID 1 MG/1
1000 TABLET ORAL
COMMUNITY
Start: 2024-11-19

## 2024-12-02 RX ORDER — DOLUTEGRAVIR SODIUM AND LAMIVUDINE 50; 300 MG/1; MG/1
1 TABLET, FILM COATED ORAL
COMMUNITY
Start: 2024-11-01

## 2024-12-02 RX ORDER — NAPROXEN SODIUM 220 MG/1
TABLET, FILM COATED ORAL
COMMUNITY
Start: 2024-11-19

## 2024-12-02 RX ORDER — HYDROXYZINE PAMOATE 50 MG/1
50 CAPSULE ORAL DAILY PRN
COMMUNITY
Start: 2024-11-25

## 2024-12-02 NOTE — LETTER
December 2, 2024        Luis Peña MD  [address] [facility] [address]             [place] - Maternal Fetal Medicine  [address]  [place] [address]  Phone: [phone]  Fax: [phone]   Patient: Windy Ponce   MR Number: [id number]   YOB: 1985   Date of Visit: 12/2/2024       Dear Dr. Peña:    Thank you for referring Windy Ponce to me for evaluation. Attached you will find relevant portions of my assessment and plan of care.    If you have questions, please do not hesitate to call me. I look forward to following Windy Ponce along with you.    Sincerely,      Kenrick Shrestha MD            CC  No Recipients    Enclosure       
No

## 2024-12-02 NOTE — PROGRESS NOTES
"Windy is here for initial MFM consultation, referred by Dr. Peña for AMA, HIV in pregnancy, she reports that her labs show "undetectable." She has a history of CHTN and DM that resolved after gastric surgery.    She is feeling fetal movement.    Windy denies vaginal bleeding, loss of fluid, recurrent contractions.    She takes Dovato  daily but is to switch to new med in a day or 2, when she receives it.  She also takes ASA 81 mg PO daily, and has several prn meds: Vistaril 50 mg, Trazodone, Clonazepam, and Sertraline 100 mg prn.      Vitals:    12/02/24 0926   BP: 90/62   Pulse: 87   Resp: 18   SpO2: 99%   Weight: 106.1 kg (234 lb)      BMI:                    41.45 kg/m^2               "

## 2024-12-02 NOTE — PROGRESS NOTES
Maternal Fetal Medicine Consultation Note    Windy Ponce  ( 1985)    Reason for Consultation    Intrauterine pregnancy at 24w6d  History of chronic hypertension: Resolved with gastric bypass  History of diabetes:  Resolved with gastric bypass  Bipolar disorder   Anxiety/depression   HIV positive with undetectable viral load  Advanced maternal age  Class 3 obesity    Provider requesting consultation:  Dr. Peña  Dear Dr. Peña    Thank you very much for your referral of Windy Ponce who is a 39 y.o.  at 24w6d gestation to the [facility].  Her Estimated Date of Delivery: 3/18/25.  I appreciate your request for imaging and consultation.    Past Medical History:    Your patient denies a history of  cardiac disease, pulmonary disease, renal disease, autoimmune disease, spontaneous thrombosis, thyroid disease, and neurologic disease.    The patient is HIV positive.  She has not undetectable viral load.  She has a history of being on highly active anti-retroviral therapy.  She has been compliant with taking her medications on a regular basis.  The patient has a history of significant weight loss which has affected her diagnosis of chronic hypertension and diabetes.  These have resolved after her bariatric surgery.    Obstetric History:  Two prior miscarriages.  Prior term delivery in  at 38 weeks gestation.  The patient delivered vaginally.    PSH:  Tonsillectomy.  Eye surgery.  Gastric bypass in .    Family hx:   Noncontributory.  The history is negative for congenital  cardiac defects, open neural tube defect, Down syndrome, aneuploidy, and common single gene disorders.      Social History:  Your patient denies tobacco use, alcohol use, and recreational drug use while pregnant.      Review of patient's allergies indicates:   Allergen Reactions    Red dye Diarrhea     Pt to test with MD soon to verify if allergic     Penicillin Rash        Review of  systems:  Your patient denies nausea, vomiting, musculoskeletal pain, weakness, headache, and pelvic pressure.    Physical exam:  Vitals:    12/02/24 0926   BP: 90/62   Pulse: 87   Resp: 18   SpO2: 99%   Weight: 106.1 kg (234 lb)     Body mass index is 41.45 kg/m².    General:    This is a well-developed well-nourished female in no apparent distress  HEENT:  Grossly within normal limits.   No facial edema is noted.  The sclera appear normal  Abdomen:  Benign exam.  The uterus is nontender to palpation.  No masses are  noted.  Extremities: No significant peripheral edema is palpable.  Neuro: Grossly intact.  The patient is alert and oriented x3.  She ambulates without issue.  Psych:  The patient is appropriate for both mood and affect.    Ultrasound: A maternal fetal medicine ultrasound was performed today.  Please SEE ATTACHED REPORT IN THE [facility] SYSTEM for full details.  In summary, imaging today was challenging due to maternal habitus.  She has a lot of redundant adipose tissue which affected acoustics.  However, no obvious fetal anatomic abnormalities are noted.  No markers for aneuploidy are seen.    Of note, the patient has no menstrual dating as she has not had any bleeding in over a year.  She thought she was going through menopause.  She did have 1 prior ultrasound at a pregnancy Care Center.  But that was only 2 weeks ago.  Therefore, the lagging fetal growth today is called into question.    Counseling:  The patient was counseled that she has advanced maternal age.  She was already aware of the availability of serologic testing via cell free DNA.  We would recommend the cell free DNA test that is provided in your office.  The patient was counseled that the baby looks normal.  We also discussed the difficulties in accurate diagnosis and an individual that comes to pregnancy later in gestation without a good menstrual history.  Patient was advised that we will see her back in 4 weeks at that time we will  repeat measurements.  We will see how interval growth is progressing.  Otherwise, the patient was counseled live a healthy lifestyle with a well-rounded diet and normal activity.  She was encouraged to take her HIV medications daily.    Impression:  Pregnancy at approximately 25 weeks gestation based upon a 23 week ultrasound at an outlying pregnancy help clinic.  The patient is status post bariatric surgery with significant weight loss and it currently has no hypertension or diabetes.  She still has significant adipose tissue in her lower abdomen which limited at fetal visualization.  Overall my impression is a pregnancy with a low probability of aneuploidy.    Recommendations:  Upon further review, the patient does have a cell free DNA analysis and progress but has not been resulted yet  For the time being, we will use the patient has outlying clinic due date.  When the patient returns based upon the amount of linear growth between scans we may consider using today's study as her best dating criteria.  That will be left up to the discretion of the [facility] doctor at that clinic.  The patient was candidate for routine diabetes screening at the present time.  Try to complete diabetes screening by 28 weeks gestation  The patient was encouraged to remain on her anti-retroviral therapy daily   She was also encouraged to live a healthy lifestyle with a nutritious diet and normal activity.  With your permission we will see the patient back here in the MFM Center at [facility] in 4 weeks.    Thanks once again for allowing us to participate in the care of your patient.  If you have any questions please feel free to call us at any time.    Sincerely,     Kenrick Piedra MD  Maternal-Fetal Medicine     I spent a total of 32 minutes on this visit. This includes face to face time and non-face to face time preparing to see the patient (eg, review of tests), obtaining and/or reviewing separately obtained history,  documenting clinical information in the electronic or other health record, independently interpreting results and communicating results to the patient/family/caregiver, or care coordinator.

## 2024-12-23 DIAGNOSIS — O09.523 MULTIGRAVIDA OF ADVANCED MATERNAL AGE IN THIRD TRIMESTER: ICD-10-CM

## 2024-12-23 DIAGNOSIS — O98.719 MATERNAL HIV INFECTION DURING PREGNANCY: Primary | ICD-10-CM

## 2025-01-06 ENCOUNTER — PROCEDURE VISIT (OUTPATIENT)
Dept: MATERNAL FETAL MEDICINE | Facility: CLINIC | Age: 40
End: 2025-01-06
Payer: MEDICAID

## 2025-01-06 VITALS
RESPIRATION RATE: 20 BRPM | OXYGEN SATURATION: 99 % | WEIGHT: 254 LBS | SYSTOLIC BLOOD PRESSURE: 100 MMHG | HEART RATE: 78 BPM | DIASTOLIC BLOOD PRESSURE: 70 MMHG | BODY MASS INDEX: 44.99 KG/M2

## 2025-01-06 DIAGNOSIS — O09.523 MULTIGRAVIDA OF ADVANCED MATERNAL AGE IN THIRD TRIMESTER: ICD-10-CM

## 2025-01-06 DIAGNOSIS — O36.5930 POOR FETAL GROWTH AFFECTING MANAGEMENT OF MOTHER IN THIRD TRIMESTER, SINGLE OR UNSPECIFIED FETUS: Primary | ICD-10-CM

## 2025-01-06 DIAGNOSIS — O98.719 MATERNAL HIV INFECTION DURING PREGNANCY: ICD-10-CM

## 2025-01-06 PROCEDURE — 99214 OFFICE O/P EST MOD 30 MIN: CPT | Mod: S$PBB,TH,, | Performed by: OBSTETRICS & GYNECOLOGY

## 2025-01-06 PROCEDURE — 76820 UMBILICAL ARTERY ECHO: CPT | Mod: 26,S$PBB,, | Performed by: OBSTETRICS & GYNECOLOGY

## 2025-01-06 PROCEDURE — 76816 OB US FOLLOW-UP PER FETUS: CPT | Mod: 26,S$PBB,, | Performed by: OBSTETRICS & GYNECOLOGY

## 2025-01-06 PROCEDURE — 76819 FETAL BIOPHYS PROFIL W/O NST: CPT | Mod: 26,S$PBB,, | Performed by: OBSTETRICS & GYNECOLOGY

## 2025-01-06 RX ORDER — TRAZODONE HYDROCHLORIDE 100 MG/1
TABLET ORAL
COMMUNITY
Start: 2024-12-31

## 2025-01-06 NOTE — PROGRESS NOTES
"Windy is here for followup [facility] consultation for + HIV on meds, and AMA referred by Dr. Peña. She states her last labs were "good" and will have them drawn again tomorrow.    She is feeling fetal movement.    Windy denies vaginal bleeding, loss of fluid, recurrent contractions.    She is taking ASA 81 mg PO daily, Dovato  mg, Zoloft 100 mg PO QHS, folic acid daily, and Klonopin 0.5 mg prn, Trazadone 100 mg PO prn, Vistaril prn.    Vitals:    01/06/25 0938   BP: 100/70   Pulse: 78   Resp: 20   SpO2: 99%   Weight: 115.2 kg (254 lb)     BMI:                    44.99 kg/m^2   "

## 2025-01-06 NOTE — PROGRESS NOTES
Maternal Fetal Medicine Consultation Note    Windy Ponce  ( 1985)    Reason for Consultation    Intrauterine pregnancy at 29w6d  Late Prenatal care and poor OB dating  Bipolar disorder   Anxiety/depression   HIV positive with undetectable viral load  Advanced maternal age  Class 3 obesity    Provider requesting consultation:  Dr. Peña  Dear Dr. Peña    Thank you very much for your referral of Windy Ponce who is a 40 y.o.  at 29w6d gestation to the M[facility]etal Medicine Center of [facility].  Her Estimated Date of Delivery: 3/18/25.  I appreciate your request for imaging and consultation.  It appears she had a previous Carlie-en-y procedure and occasionally suffers from dumping syndrome, however has done well with her GTT which she reports she passed.  She informs me that she will be having her HIV labs drawn tomorrow and will follow up with ID next week.  She has poor dating in this pregnancy and follow up ultrasound today to determine EDC.        Review of systems:  Your patient denies nausea, vomiting, musculoskeletal pain, weakness, headache, and pelvic pressure.    Physical exam:  /70   Pulse 78   Resp 20   Wt 115.2 kg (254 lb)   SpO2 99%   BMI 44.99 kg/m²    General:    This is a well-developed well-nourished female in no apparent distress  HEENT:  Upper teeth extracted.   No facial edema is noted.  The sclera appear normal  Abdomen:  Benign exam.  The uterus is nontender to palpation.  No masses are  noted.  Extremities: No significant peripheral edema is palpable.  Neuro: Grossly intact.  The patient is alert and oriented x3.  She ambulates without issue.  Psych:  The patient is appropriate for both mood and affect.    Ultrasound: Single intrauterine pregnancy measuring 28 weeks and 4 days with an estimated fetal weight of 1201g.  This is consistent with a previously determined BRENDA at the 17th percentile, however the abdominal circumference is at the 5th percentile  consistent with IUGR.  The fetus is in the breech presentation.  The placenta is posterior.  The amniotic fluid is normal.  Completion of the anatomical survey was performed and there are no structural anomalies or aneuploidy on ultrasound today. A BPP was performed that was .  Umbilical artery dopplers were performed that showed an S/D ratio of 3.43 which is normal.  She has undergone genetic screening with NIPT that was low risk.     Counseling: I counseled her that today's ultrasound is of reassurance and no structural anomalies were seen.  There has been appropriate interval growth and therefore will continue with previous EDC of 3/18/25 and I will no change her due date.        Impression:  Reassuring and completed anatomy  Blood pressure normal  HIV with undetectable viral load    Recommendations:  I would like for Windy to follow up again in 1 month to reassess fetal growth and fluid.    I recommend  testing to begin in your office at 32 weeks gestation  The patient was encouraged to remain on her anti-retroviral therapy daily   She was also encouraged to live a healthy lifestyle with a nutritious diet and normal activity.  Delivery recommendations is between 39 0/7 weeks to 39 6/7 weeks unless clinically indicated sooner.  She is able to undergo a vaginal delivery as long as her viral load is <1,000.  AZT would not be indicated, however is acceptable.      Thanks once again for allowing us to participate in the care of your patient.  If you have any questions please feel free to call us at any time.    Sincerely,     Chantel Rojas, DO  Maternal-Fetal Medicine

## 2025-01-06 NOTE — LETTER
January 6, 2025        Luis Peña MD  [address] [facility] [address]             [place] - Maternal Fetal Medicine  [address]  [place] [address]  Phone: [phone]  Fax: [phone]   Patient: Windy Ponce   MR Number: [id number]   YOB: 1985   Date of Visit: 1/6/2025       Dear Dr. Peña:    Thank you for referring Windy Ponce to me for evaluation. Below are the relevant portions of my assessment and plan of care.            If you have questions, please do not hesitate to call me. I look forward to following Windy along with you.    Sincerely,      Chantel Rojas, DO           CC  No Recipients

## 2025-01-28 DIAGNOSIS — O09.523 MULTIGRAVIDA OF ADVANCED MATERNAL AGE IN THIRD TRIMESTER: ICD-10-CM

## 2025-01-28 DIAGNOSIS — O98.719 MATERNAL HIV INFECTION DURING PREGNANCY: Primary | ICD-10-CM

## 2025-02-03 ENCOUNTER — PROCEDURE VISIT (OUTPATIENT)
Dept: MATERNAL FETAL MEDICINE | Facility: CLINIC | Age: 40
End: 2025-02-03
Payer: MEDICAID

## 2025-02-03 VITALS
WEIGHT: 249 LBS | RESPIRATION RATE: 18 BRPM | DIASTOLIC BLOOD PRESSURE: 64 MMHG | HEART RATE: 88 BPM | BODY MASS INDEX: 44.11 KG/M2 | SYSTOLIC BLOOD PRESSURE: 100 MMHG | OXYGEN SATURATION: 99 %

## 2025-02-03 DIAGNOSIS — O98.719 MATERNAL HIV INFECTION DURING PREGNANCY: ICD-10-CM

## 2025-02-03 DIAGNOSIS — O09.523 MULTIGRAVIDA OF ADVANCED MATERNAL AGE IN THIRD TRIMESTER: ICD-10-CM

## 2025-02-03 PROCEDURE — 76820 UMBILICAL ARTERY ECHO: CPT | Mod: 26,S$PBB,, | Performed by: OBSTETRICS & GYNECOLOGY

## 2025-02-03 PROCEDURE — 76819 FETAL BIOPHYS PROFIL W/O NST: CPT | Mod: 26,S$PBB,, | Performed by: OBSTETRICS & GYNECOLOGY

## 2025-02-03 PROCEDURE — 99213 OFFICE O/P EST LOW 20 MIN: CPT | Mod: S$PBB,TH,, | Performed by: OBSTETRICS & GYNECOLOGY

## 2025-02-03 PROCEDURE — 76815 OB US LIMITED FETUS(S): CPT | Mod: 26,S$PBB,, | Performed by: OBSTETRICS & GYNECOLOGY

## 2025-02-03 NOTE — PROGRESS NOTES
Follow-up MFM Consultation  Referring provider: Dr. Peña    Indications for referral:  1) Pregnancy at 33w6d (with an Estimated Date of Delivery: 3/18/25)  2) HIV positive with undetectable viral load  3) Fetal growth restriction by AC  4) Concern for low lying placenta    Dear Dr. Peña,  Thank you for your kind request for consultation and imaging of your patient at the Center for Maternal-Fetal Medicine at [facility].  There have been no changes in her history since her last visit here. She has no complaints today     Physical Exam  Vital signs: /64   Pulse 88   Resp 18   Wt 112.9 kg (249 lb)   SpO2 99%   BMI 44.11 kg/m²   General: Age appearing female in no apparent distress.  ABDOMEN:  Gravid, soft, nontender  Uterus: Nontender, appropriate height for gestational age    ULTRASOUND FINDINGS:  A repeat ultrasound was performed. The fetus is cephalic. EFW of 1972 grams is at the 12th percentile.  Abdominal circumference is at the 8th percentile consistent with fetal growth restriction. The placenta is posterior and NOT low wlying.  Amniotic fluid is normal. Biophysical profile is 8/8. There are no fetal structural malformations to extent of view. Please see accompanying detailed Viewpoint report.    IMPRESSION:     1) 33+6wga  2) HIV with undetectable viral load  3) Fetal growth restriction by AC with normal Dopplers and BPP  4) Normal placental location    RECOMMENDATIONS/DISCUSSION:  1) The fetal growth restriction has persisted, but fetal wellbeing is reassuring given normal Dopplers and BPPs.  She should continue to have  testing in your office until delivery.  Because of the growth restriction, delivery is no recommended during the 38th week.    2) We will see her back here in 3 weeks to reevaluate.    Thank you for allowing us to participate in her care.  Please do not hesitate to call with questions.  For any questions feel free to call our oncall [facility]  at [phone].  Mike  MD Sen

## 2025-02-03 NOTE — PROGRESS NOTES
Windy is here for followup [facility] consultation for HIV and AMA, referred by Dr. Peña. She states that her last labs were good and repeating in March.    She is feeling fetal movement.    Windy denies vaginal bleeding, loss of fluid, recurrent contractions.    She is taking ASA 81 mg PO daily, Dovato  daily, Zoloft 100 mg PO QHS prn, Trazadone 100 mg PO daily prn, Vistaril prn, and Klonopin 0.5 mg PO prn.      Vitals:    02/03/25 0924   BP: 100/64   Pulse: 88   Resp: 18   SpO2: 99%   Weight: 112.9 kg (249 lb)     BMI:                    44.11 kg/m^2

## 2025-02-03 NOTE — LETTER
February 3, 2025        Luis Peña MD  [address] [facility] [address]             [place] - Maternal Fetal Medicine  [address]  [place] [address]  Phone: [phone]  Fax: [phone]   Patient: Windy Ponce   MR Number: [id number]   YOB: 1985   Date of Visit: 2/3/2025       Dear Dr. Peña:    Thank you for referring Windy Ponce to me for evaluation. Below are the relevant portions of my assessment and plan of care.            If you have questions, please do not hesitate to call me. I look forward to following Windy along with you.    Sincerely,      Dalila Chatterjee MD           CC  No Recipients

## 2025-02-11 DIAGNOSIS — O36.5930 POOR FETAL GROWTH AFFECTING MANAGEMENT OF MOTHER IN THIRD TRIMESTER, SINGLE OR UNSPECIFIED FETUS: ICD-10-CM

## 2025-02-11 DIAGNOSIS — O98.719 MATERNAL HIV INFECTION DURING PREGNANCY: Primary | ICD-10-CM

## 2025-02-11 DIAGNOSIS — O09.523 MULTIGRAVIDA OF ADVANCED MATERNAL AGE IN THIRD TRIMESTER: ICD-10-CM
